# Patient Record
Sex: FEMALE | Race: BLACK OR AFRICAN AMERICAN | NOT HISPANIC OR LATINO | Employment: STUDENT | URBAN - METROPOLITAN AREA
[De-identification: names, ages, dates, MRNs, and addresses within clinical notes are randomized per-mention and may not be internally consistent; named-entity substitution may affect disease eponyms.]

---

## 2017-01-12 ENCOUNTER — OFFICE VISIT (OUTPATIENT)
Dept: OBSTETRICS AND GYNECOLOGY | Facility: CLINIC | Age: 27
End: 2017-01-12
Payer: MEDICAID

## 2017-01-12 VITALS
SYSTOLIC BLOOD PRESSURE: 126 MMHG | BODY MASS INDEX: 51.12 KG/M2 | DIASTOLIC BLOOD PRESSURE: 72 MMHG | WEIGHT: 260.38 LBS | HEIGHT: 60 IN

## 2017-01-12 DIAGNOSIS — Z01.419 WELL WOMAN EXAM WITH ROUTINE GYNECOLOGICAL EXAM: ICD-10-CM

## 2017-01-12 DIAGNOSIS — Z12.4 ROUTINE PAPANICOLAOU SMEAR: Primary | ICD-10-CM

## 2017-01-12 PROCEDURE — 99212 OFFICE O/P EST SF 10 MIN: CPT | Mod: PBBFAC,PO | Performed by: OBSTETRICS & GYNECOLOGY

## 2017-01-12 PROCEDURE — 99999 PR PBB SHADOW E&M-EST. PATIENT-LVL II: CPT | Mod: PBBFAC,,, | Performed by: OBSTETRICS & GYNECOLOGY

## 2017-01-12 PROCEDURE — 99385 PREV VISIT NEW AGE 18-39: CPT | Mod: S$PBB,,, | Performed by: OBSTETRICS & GYNECOLOGY

## 2017-01-12 PROCEDURE — 88175 CYTOPATH C/V AUTO FLUID REDO: CPT

## 2017-01-12 RX ORDER — NORELGESTROMIN AND ETHINYL ESTRADIOL 35; 150 UG/MG; UG/MG
1 PATCH TRANSDERMAL
Qty: 4 PATCH | Refills: 11 | Status: SHIPPED | OUTPATIENT
Start: 2017-01-12 | End: 2018-01-29 | Stop reason: SDUPTHER

## 2017-01-12 NOTE — PROGRESS NOTES
HPI:   26 y.o.   OB History      Para Term  AB TAB SAB Ectopic Multiple Living    2 2 2              Patient's last menstrual period was 2017 (within days).    Patient is  here for her annual gynecologic exam.  She has no complaints.     ROS:  GENERAL: No fever, chills, fatigability or weight loss.  SKIN: No rashes, itching or changes in color or texture of skin.  HEAD: No headaches or recent head trauma.  EYES: Visual acuity fine. No photophobia, ocular pain or diplopia.  EARS: Denies ear pain, discharge or vertigo.  NOSE: No loss of smell, no epistaxis or postnasal drip.  MOUTH & THROAT: No hoarseness or change in voice. No excessive gum bleeding.  NODES: Denies swollen glands.  CHEST: Denies TORRES, cyanosis, wheezing, cough and sputum production.  CARDIOVASCULAR: Denies chest pain, PND, orthopnea or reduced exercise tolerance.  ABDOMEN: Appetite fine. No weight loss. Denies diarrhea, abdominal pain, hematemesis or blood in stool.  URINARY: No flank pain, dysuria or hematuria.  PERIPHERAL VASCULAR: No claudication or cyanosis.  MUSCULOSKELETAL: No joint stiffness or swelling. Denies back pain.  NEUROLOGIC: No history of seizures, paralysis, alteration of gait or coordination.    PE:   Visit Vitals    /72    Ht 5' (1.524 m)    Wt 118.1 kg (260 lb 5.8 oz)    LMP 2017 (Within Days)    BMI 50.85 kg/m2     APPEARANCE: Well nourished, well developed, in no acute distress.  NECK: Neck symmetric without masses or thyromegaly.  BREASTS: Symmetrical, no skin changes or visible lesions. No palpable masses, nipple discharge or adenopathy bilaterally.  ABDOMEN: Flat. Soft. No tenderness or masses. No hepatosplenomegaly. No hernias. No CVA tenderness.  VULVA: No lesions. Normal female genitalia.  URETHRAL MEATUS: Normal size and location, no lesions, no prolapse.  URETHRA: No masses, tenderness, prolapse or scarring.  VAGINA: Moist and well rugated, no discharge, no significant cystocele or  rectocele.  CERVIX: No lesions and discharge. PAP done.  UTERUS: Normal size, regular shape, mobile, non-tender, bladder base nontender.  ADNEXA: No masses, tenderness or CDS nodularity.  ANUS PERINEUM: Normal.    PROCEDURES:  Pap smear    Assessment:  Normal Gynecologic Exam    Plan:  Mammogram and Colonoscopy if indicated by current recommendations.  Return to clinic in one year or for any problems or complaints.  Wasn't patches, was on depo   Hx reg cycles in past

## 2017-08-27 ENCOUNTER — HOSPITAL ENCOUNTER (EMERGENCY)
Facility: HOSPITAL | Age: 27
Discharge: HOME | End: 2017-08-27
Attending: EMERGENCY MEDICINE
Payer: MEDICAID

## 2017-08-27 VITALS
OXYGEN SATURATION: 99 % | DIASTOLIC BLOOD PRESSURE: 82 MMHG | BODY MASS INDEX: 52.25 KG/M2 | RESPIRATION RATE: 20 BRPM | HEIGHT: 60 IN | HEART RATE: 72 BPM | TEMPERATURE: 99 F | SYSTOLIC BLOOD PRESSURE: 128 MMHG | WEIGHT: 266.13 LBS

## 2017-08-27 DIAGNOSIS — M79.642 LEFT HAND PAIN: ICD-10-CM

## 2017-08-27 DIAGNOSIS — M25.532 LEFT WRIST PAIN: Primary | ICD-10-CM

## 2017-08-27 DIAGNOSIS — M25.539 WRIST PAIN: ICD-10-CM

## 2017-08-27 LAB
B-HCG UR QL: NEGATIVE
CTP QC/QA: YES

## 2017-08-27 PROCEDURE — 99284 EMERGENCY DEPT VISIT MOD MDM: CPT | Mod: ,,, | Performed by: PHYSICIAN ASSISTANT

## 2017-08-27 PROCEDURE — 99284 EMERGENCY DEPT VISIT MOD MDM: CPT | Mod: 25

## 2017-08-27 PROCEDURE — 29125 APPL SHORT ARM SPLINT STATIC: CPT | Mod: LT

## 2017-08-27 PROCEDURE — 25000003 PHARM REV CODE 250: Performed by: PHYSICIAN ASSISTANT

## 2017-08-27 PROCEDURE — 81025 URINE PREGNANCY TEST: CPT | Performed by: PHYSICIAN ASSISTANT

## 2017-08-27 RX ORDER — NAPROXEN 500 MG/1
500 TABLET ORAL 2 TIMES DAILY WITH MEALS
Qty: 20 TABLET | Refills: 0 | Status: SHIPPED | OUTPATIENT
Start: 2017-08-27 | End: 2018-04-03

## 2017-08-27 RX ORDER — NAPROXEN 500 MG/1
500 TABLET ORAL
Status: COMPLETED | OUTPATIENT
Start: 2017-08-27 | End: 2017-08-27

## 2017-08-27 RX ADMIN — NAPROXEN 500 MG: 500 TABLET ORAL at 02:08

## 2017-08-27 NOTE — ED TRIAGE NOTES
Pain to left hand and wrist since Wednesday.  Denies injury.    GENERAL: The patient is well-developed and well-nourished in no apparent distress. Alert and oriented x4.                                                HEENT: Head is normocephalic and atraumatic. Extraocular muscles are intact. Pupils are equal, round, and reactive to light and accommodation. Nares appeared normal. Mouth is well hydrated and without lesions. Mucous membranes are moist. Posterior pharynx clear of any exudate or lesions.    NECK: Supple. No carotid bruits. No lymphadenopathy or thyromegaly.    LUNGS: Clear to auscultation.    HEART: Regular rate and rhythm without murmur.     ABDOMEN: Soft, nontender, and nondistended. Positive bowel sounds. No hepatosplenomegaly was noted.     EXTREMITIES: Without any cyanosis, clubbing, rash, lesions or edema.     NEUROLOGIC: Cranial nerves II through XII are grossly intact.     PSYCHIATRIC: Flat affect, but denies suicidal or homicidal ideations.    SKIN: No ulceration or induration present.

## 2017-08-27 NOTE — ED PROVIDER NOTES
"Encounter Date: 8/27/2017    SCRIBE #1 NOTE: I, Pratik Barba, am scribing for, and in the presence of,  Dr. Adhikari . I have scribed the following portions of the note - the APC attestation.       History     Chief Complaint   Patient presents with    Hand Pain     pt states left wrist pain x 4 days - denies injury or trauma - no swelling noted      26 y/o AAF with PMHx of asthma presents to the ED c/o L wrist and hand pain x 6 days. She describes the pain as constant progressively worsening 10/10 "sharp" pain that is worse with movement. She is R hand dominant. She has taken ibuprofen, aleve, tylenol, baclofen with no relief. She denies any trauma. She does report minimal swelling. She denies any PSHx of the L hand or wrist. She reports some swelling but denies any erythema, warmth, rashes. She denies fever, chills, chest pain, SOB, abdominal pain, n/v. She socially drinks alcohol and denies tobacco or drug use.      The history is provided by the patient.     Review of patient's allergies indicates:  No Known Allergies  Past Medical History:   Diagnosis Date    Asthma      History reviewed. No pertinent surgical history.  Family History   Problem Relation Age of Onset    Hypertension Mother      Social History   Substance Use Topics    Smoking status: Never Smoker    Smokeless tobacco: Never Used    Alcohol use Yes      Comment: socially     Review of Systems   Constitutional: Negative for chills and fever.   HENT: Negative for congestion, rhinorrhea and sore throat.    Eyes: Negative for photophobia and visual disturbance.   Respiratory: Negative for shortness of breath.    Cardiovascular: Negative for chest pain.   Gastrointestinal: Negative for abdominal pain, constipation, diarrhea, nausea and vomiting.   Genitourinary: Negative for dysuria and hematuria.   Musculoskeletal: Positive for joint swelling and myalgias. Negative for neck pain and neck stiffness.   Skin: Negative for rash and wound. "   Neurological: Negative for dizziness, syncope, weakness, light-headedness, numbness and headaches.   Psychiatric/Behavioral: Negative for confusion.       Physical Exam     Initial Vitals [08/27/17 1300]   BP Pulse Resp Temp SpO2   139/69 75 16 98.8 °F (37.1 °C) 98 %      MAP       92.33         Physical Exam    Nursing note and vitals reviewed.  Constitutional: She appears well-developed and well-nourished. She is not diaphoretic. No distress.   HENT:   Head: Normocephalic and atraumatic.   Neck: Normal range of motion. Neck supple.   Cardiovascular: Normal rate, regular rhythm and normal heart sounds. Exam reveals no gallop and no friction rub.    No murmur heard.  Pulmonary/Chest: Breath sounds normal. She has no wheezes. She has no rhonchi. She has no rales.   Abdominal: Soft. Bowel sounds are normal. There is no tenderness. There is no rebound and no guarding.   Musculoskeletal: She exhibits tenderness.        Left wrist: She exhibits decreased range of motion, tenderness and bony tenderness. She exhibits no swelling.        Left hand: She exhibits decreased range of motion and tenderness. She exhibits no laceration. Normal sensation noted. Normal strength noted.   L radial pulse 2+   Neurological: She is alert and oriented to person, place, and time.   Skin: Skin is warm and dry. No rash noted. No erythema.   Psychiatric: She has a normal mood and affect.         ED Course   Procedures  Labs Reviewed   POCT URINE PREGNANCY        Imaging Results          X-Ray Wrist Complete Left (Final result)  Result time 08/27/17 15:01:32    Final result by Rhys Chappell MD (08/27/17 15:01:32)                 Impression:      1.  No acute displaced fracture or dislocation of the hand or wrist noted mild edema about the distal aspect of the arm, partially imaged, correlation recommended, could reflect contusion.        Electronically signed by: RHYS CHAPPELL MD  Date:     08/27/17  Time:    15:01               Narrative:    Wrist complete 3 views left, hand complete 3 views left    Clinical history: Left wrist and hand pain    Comparison: None    Findings:  3 views hand, 3 views wrist.    There is mild edema about the distal dorsal aspect of the arm.  No acute displaced fracture or dislocation of the wrist or hand.  There is minimal edema of the dorsal aspect of the hand.                             X-Ray Hand 3 view Left (Final result)  Result time 08/27/17 15:01:32    Final result by Rhys Chappell MD (08/27/17 15:01:32)                 Impression:      1.  No acute displaced fracture or dislocation of the hand or wrist noted mild edema about the distal aspect of the arm, partially imaged, correlation recommended, could reflect contusion.        Electronically signed by: RHYS CHAPPELL MD  Date:     08/27/17  Time:    15:01              Narrative:    Wrist complete 3 views left, hand complete 3 views left    Clinical history: Left wrist and hand pain    Comparison: None    Findings:  3 views hand, 3 views wrist.    There is mild edema about the distal dorsal aspect of the arm.  No acute displaced fracture or dislocation of the wrist or hand.  There is minimal edema of the dorsal aspect of the hand.                                 Medical Decision Making:   History:   Old Medical Records: I decided to obtain old medical records.  Clinical Tests:   Lab Tests: Ordered and Reviewed  Radiological Study: Ordered and Reviewed       APC / Resident Notes:   26 y/o AAF with PMHx of asthma presents to the ED c/o L wrist and hand pain x 6 days.  VSS. RRR. Lungs CTA. Abdomen soft and nontender. Tenderness noted to the L wrist and hand. No significant swelling. No erythema, warmth, rashes. L radial pulse 2+. Normal sensation. I do not suspect ischemic limb. DDx includes but is not limited to fracture, dislocation, sprain. Will get xrays.     UPT negative.    Xray L wrist and L hand with no fracture or dislocation.  I do not feel  that she needs any further labs or imaging at this time. Stable for discharge.    L wrist placed in wrist brace. She was discharged with a prescription for naproxen.  She will follow up with her PCP and orthopedics.  All of the patient's questions were answered.  I reviewed the patient's chart, labs, and imaging and discussed the case with my supervising physician.          Scribe Attestation:   Scribe #1: I performed the above scribed service and the documentation accurately describes the services I performed. I attest to the accuracy of the note.    Attending Attestation:     Physician Attestation Statement for NP/PA:   I discussed this assessment and plan of this patient with the NP/PA, but I did not personally examine the patient. The face to face encounter was performed by the NP/PA.    Other NP/PA Attestation Additions:    History of Present Illness: 28 yo female presents for evaluation of left wrist pain without hx of trauma.          Physician Attestation for Scribe:  Physician Attestation Statement for Scribe #1: I, Dr. Adhikari , reviewed documentation, as scribed by Pratik Barba in my presence, and it is both accurate and complete.                 ED Course     Clinical Impression:   The primary encounter diagnosis was Left wrist pain. Diagnoses of Wrist pain and Left hand pain were also pertinent to this visit.    Disposition:   Disposition: Discharged  Condition: Stable                        Brissa Cornelius PA-C  08/27/17 5384

## 2018-01-29 RX ORDER — NORELGESTROMIN AND ETHINYL ESTRADIOL 35; 150 UG/MG; UG/MG
1 PATCH TRANSDERMAL
Qty: 4 PATCH | Refills: 0 | Status: SHIPPED | OUTPATIENT
Start: 2018-01-29 | End: 2019-01-29

## 2018-01-29 NOTE — TELEPHONE ENCOUNTER
Returned patients call.   Patient was notified she is due for an annual and only one month can be sent in until she schedules. Patient verbalized understanding. Patient will schedule as soon as possible.

## 2018-01-29 NOTE — TELEPHONE ENCOUNTER
----- Message from Lazaro Acosta sent at 1/29/2018 11:15 AM CST -----  Contact: [phone]/self  Pt requesting to speak with you concerning her prescriptions.  Please call and advise

## 2018-03-11 ENCOUNTER — HOSPITAL ENCOUNTER (EMERGENCY)
Facility: HOSPITAL | Age: 28
Discharge: HOME | End: 2018-03-11
Attending: EMERGENCY MEDICINE
Payer: MEDICAID

## 2018-03-11 VITALS
RESPIRATION RATE: 18 BRPM | TEMPERATURE: 100 F | HEART RATE: 91 BPM | DIASTOLIC BLOOD PRESSURE: 77 MMHG | OXYGEN SATURATION: 99 % | SYSTOLIC BLOOD PRESSURE: 132 MMHG | HEIGHT: 60 IN

## 2018-03-11 DIAGNOSIS — H10.9 CONJUNCTIVITIS, UNSPECIFIED CONJUNCTIVITIS TYPE, UNSPECIFIED LATERALITY: ICD-10-CM

## 2018-03-11 DIAGNOSIS — J02.0 STREP PHARYNGITIS: Primary | ICD-10-CM

## 2018-03-11 PROCEDURE — 99283 EMERGENCY DEPT VISIT LOW MDM: CPT

## 2018-03-11 PROCEDURE — 25000003 PHARM REV CODE 250: Performed by: EMERGENCY MEDICINE

## 2018-03-11 PROCEDURE — 99283 EMERGENCY DEPT VISIT LOW MDM: CPT | Mod: ,,, | Performed by: EMERGENCY MEDICINE

## 2018-03-11 RX ORDER — ACETAMINOPHEN 650 MG/20.3ML
650 LIQUID ORAL
Status: COMPLETED | OUTPATIENT
Start: 2018-03-11 | End: 2018-03-11

## 2018-03-11 RX ORDER — PENICILLIN V POTASSIUM 250 MG/5ML
500 POWDER, FOR SOLUTION ORAL 2 TIMES DAILY
Qty: 200 ML | Refills: 0 | Status: SHIPPED | OUTPATIENT
Start: 2018-03-11 | End: 2018-03-21

## 2018-03-11 RX ADMIN — ACETAMINOPHEN 650 MG: 650 SOLUTION ORAL at 06:03

## 2018-03-11 NOTE — ED TRIAGE NOTES
Presents to ER with complaint of sore throat for 3 days.  Also complaining of burning and itching to both eyes.  States that she saw her eye doctor and was told she has viral conjunctivitis.    Pt identifiers checked and correct  LOC: The patient is awake, alert, aware of environment with an appropriate affect. Oriented x3, speaking appropriately  APPEARANCE: Pt resting comfortably, in no acute distress, pt is clean and well groomed, clothing properly fastened  SKIN: Skin warm, dry and intact, normal skin turgor, moist mucus membranes  RESPIRATORY: Airway is open and patent, respirations are spontaneous, even and unlabored, normal effort and rate  MUSCULOSKELETAL: No obvious deformities.

## 2018-03-11 NOTE — ED PROVIDER NOTES
"Encounter Date: 3/11/2018    SCRIBE #1 NOTE: I, Renetta Darnell, am scribing for, and in the presence of, Dr. Coy Bhat.       History     Chief Complaint   Patient presents with    Sore Throat     sore throat, "can't swallow", both eyes redness    eye redness     Time seen by provider: 5:57 PM    This is a 28 y.o. female with medical conditions including asthma, who presents with complaint of sore throat, bilateral ear pain, and sinus congestion for 2-3 days. The patient reports she has had associated fever, chills and eye discharge. Her children were evaluated for a similar illness about 2 weeks ago and reports they were determined to have strep and a viral illness.       The history is provided by the patient.     Review of patient's allergies indicates:  No Known Allergies  Past Medical History:   Diagnosis Date    Asthma      History reviewed. No pertinent surgical history.  Family History   Problem Relation Age of Onset    Hypertension Mother      Social History   Substance Use Topics    Smoking status: Never Smoker    Smokeless tobacco: Never Used    Alcohol use Yes      Comment: socially     Review of Systems   Constitutional: Positive for chills and fever.   HENT: Positive for congestion, ear pain, postnasal drip, sinus pressure and sore throat.    Eyes: Positive for pain (Bilateral, associated with sinus congestion) and discharge. Negative for visual disturbance.   Respiratory: Positive for cough. Negative for shortness of breath.    Cardiovascular: Negative for chest pain.   Gastrointestinal: Negative for abdominal pain.   Genitourinary: Negative for difficulty urinating.   Musculoskeletal: Negative for gait problem.   Skin: Negative for rash.   Neurological: Negative for speech difficulty.       Physical Exam     Initial Vitals [03/11/18 1652]   BP Pulse Resp Temp SpO2   132/77 91 18 100 °F (37.8 °C) 99 %      MAP       95.33         Physical Exam    Nursing note and vitals " reviewed.  Constitutional: She appears well-developed and well-nourished. She is not diaphoretic. No distress.   HENT:   Head: Normocephalic and atraumatic.   Right Ear: Tympanic membrane normal. Tympanic membrane is not erythematous and not bulging.   Left Ear: Tympanic membrane normal. Tympanic membrane is not erythematous and not bulging.   Erythema to tonsils bilaterally. They are enlarged with multiple pustules.    Eyes: EOM are normal. Pupils are equal, round, and reactive to light. Right conjunctiva is injected. Left conjunctiva is injected.   Neck: Normal range of motion. Neck supple.   Cardiovascular: Normal rate, regular rhythm and normal heart sounds.   Pulmonary/Chest: Breath sounds normal. No respiratory distress.   Abdominal: Soft. She exhibits no distension.   Musculoskeletal: Normal range of motion. She exhibits no edema.   Lymphadenopathy:     She has cervical adenopathy (tender, anterior ).   Neurological: She is alert and oriented to person, place, and time. She has normal strength.   Skin: Skin is warm and dry.   Psychiatric: She has a normal mood and affect.         ED Course   Procedures  Labs Reviewed - No data to display          Medical Decision Making:   History:   Old Medical Records: I decided to obtain old medical records.  Initial Assessment:   Patient here with symptoms consistent with strep pharengitis and possible viral conjunctivitis. She is hemodynamically stable. Temperature slightly elevated on check in, patient given liquid tylenol. I have given a prescription for oral PCN for 10 day. No overly concerning findings. Patient stable for discharge. Advised pt to follow up with PCP or return if concerning symptoms arise. Pt understands and agrees with plan. Will d/c home.                Scribe Attestation:   Scribe #1: I performed the above scribed service and the documentation accurately describes the services I performed. I attest to the accuracy of the note.               Clinical  Impression:   The primary encounter diagnosis was Strep pharyngitis. A diagnosis of Conjunctivitis, unspecified conjunctivitis type, unspecified laterality was also pertinent to this visit.    Disposition:   Disposition: Discharged  Condition: Stable                        Coy Bhat MD  03/11/18 2120

## 2018-04-03 ENCOUNTER — OFFICE VISIT (OUTPATIENT)
Dept: OBSTETRICS AND GYNECOLOGY | Facility: CLINIC | Age: 28
End: 2018-04-03
Payer: MEDICAID

## 2018-04-03 VITALS
BODY MASS INDEX: 52.15 KG/M2 | HEIGHT: 60 IN | SYSTOLIC BLOOD PRESSURE: 126 MMHG | WEIGHT: 265.63 LBS | DIASTOLIC BLOOD PRESSURE: 78 MMHG

## 2018-04-03 DIAGNOSIS — Z01.419 WELL WOMAN EXAM WITH ROUTINE GYNECOLOGICAL EXAM: ICD-10-CM

## 2018-04-03 DIAGNOSIS — Z12.4 ROUTINE PAPANICOLAOU SMEAR: Primary | ICD-10-CM

## 2018-04-03 PROCEDURE — 99395 PREV VISIT EST AGE 18-39: CPT | Mod: S$PBB,,, | Performed by: OBSTETRICS & GYNECOLOGY

## 2018-04-03 PROCEDURE — 88175 CYTOPATH C/V AUTO FLUID REDO: CPT

## 2018-04-03 PROCEDURE — 99212 OFFICE O/P EST SF 10 MIN: CPT | Mod: PBBFAC,PO | Performed by: OBSTETRICS & GYNECOLOGY

## 2018-04-03 PROCEDURE — 99999 PR PBB SHADOW E&M-EST. PATIENT-LVL II: CPT | Mod: PBBFAC,,, | Performed by: OBSTETRICS & GYNECOLOGY

## 2018-04-03 RX ORDER — NORELGESTROMIN AND ETHINYL ESTRADIOL 35; 150 UG/MG; UG/MG
1 PATCH TRANSDERMAL
Qty: 4 PATCH | Refills: 11 | Status: SHIPPED | OUTPATIENT
Start: 2018-04-03 | End: 2019-05-23 | Stop reason: SDUPTHER

## 2018-04-03 NOTE — PROGRESS NOTES
HPI:   28 y.o.   OB History      Para Term  AB Living    2 2 2          SAB TAB Ectopic Multiple Live Births                    No LMP recorded (lmp unknown).    Patient is  here for her annual gynecologic exam.  She has no complaints.     ROS:  GENERAL: No fever, chills, fatigability or weight loss.  SKIN: No rashes, itching or changes in color or texture of skin.  HEAD: No headaches or recent head trauma.  EYES: Visual acuity fine. No photophobia, ocular pain or diplopia.  EARS: Denies ear pain, discharge or vertigo.  NOSE: No loss of smell, no epistaxis or postnasal drip.  MOUTH & THROAT: No hoarseness or change in voice. No excessive gum bleeding.  NODES: Denies swollen glands.  CHEST: Denies TORRES, cyanosis, wheezing, cough and sputum production.  CARDIOVASCULAR: Denies chest pain, PND, orthopnea or reduced exercise tolerance.  ABDOMEN: Appetite fine. No weight loss. Denies diarrhea, abdominal pain, hematemesis or blood in stool.  URINARY: No flank pain, dysuria or hematuria.  PERIPHERAL VASCULAR: No claudication or cyanosis.  MUSCULOSKELETAL: No joint stiffness or swelling. Denies back pain.  NEUROLOGIC: No history of seizures, paralysis, alteration of gait or coordination.    PE:   /78   Ht 5' (1.524 m)   Wt 120.5 kg (265 lb 10.5 oz)   LMP  (LMP Unknown)   BMI 51.88 kg/m²   APPEARANCE: Well nourished, well developed, in no acute distress.  NECK: Neck symmetric without masses or thyromegaly.  BREASTS: Symmetrical, no skin changes or visible lesions. No palpable masses, nipple discharge or adenopathy bilaterally.  ABDOMEN: Flat. Soft. No tenderness or masses. No hepatosplenomegaly. No hernias. No CVA tenderness.  VULVA: No lesions. Normal female genitalia.  URETHRAL MEATUS: Normal size and location, no lesions, no prolapse.  URETHRA: No masses, tenderness, prolapse or scarring.  VAGINA: Moist and well rugated, no discharge, no significant cystocele or rectocele.  CERVIX: No lesions and  discharge. PAP done.  UTERUS: Normal size, regular shape, mobile, non-tender, bladder base nontender.  ADNEXA: No masses, tenderness or CDS nodularity.  ANUS PERINEUM: Normal.    PROCEDURES:  Pap smear    Assessment:  Normal Gynecologic Exam    Plan:  Mammogram and Colonoscopy if indicated by current recommendations.  Return to clinic in one year or for any problems or complaints.  Using patch  Off now /restart

## 2018-04-04 ENCOUNTER — PATIENT MESSAGE (OUTPATIENT)
Dept: OBSTETRICS AND GYNECOLOGY | Facility: CLINIC | Age: 28
End: 2018-04-04

## 2018-04-04 NOTE — TELEPHONE ENCOUNTER
Pt would like to know if you can prescribe xanax because she is flying to [place] this weekend and is afraid

## 2018-04-05 RX ORDER — ALPRAZOLAM 0.5 MG/1
0.5 TABLET ORAL DAILY
Qty: 10 TABLET | Refills: 0 | Status: SHIPPED | OUTPATIENT
Start: 2018-04-05 | End: 2019-04-05

## 2018-10-23 ENCOUNTER — HOSPITAL ENCOUNTER (EMERGENCY)
Facility: HOSPITAL | Age: 28
Discharge: HOME | End: 2018-10-23
Attending: EMERGENCY MEDICINE
Payer: MEDICAID

## 2018-10-23 VITALS
SYSTOLIC BLOOD PRESSURE: 128 MMHG | BODY MASS INDEX: 51.65 KG/M2 | HEIGHT: 61 IN | RESPIRATION RATE: 16 BRPM | HEART RATE: 79 BPM | WEIGHT: 273.56 LBS | TEMPERATURE: 100 F | DIASTOLIC BLOOD PRESSURE: 71 MMHG | OXYGEN SATURATION: 100 %

## 2018-10-23 DIAGNOSIS — S99.912A LEFT ANKLE INJURY: ICD-10-CM

## 2018-10-23 LAB
B-HCG UR QL: NEGATIVE
CTP QC/QA: YES

## 2018-10-23 PROCEDURE — 99283 EMERGENCY DEPT VISIT LOW MDM: CPT | Mod: 25

## 2018-10-23 PROCEDURE — 81025 URINE PREGNANCY TEST: CPT | Performed by: PHYSICIAN ASSISTANT

## 2018-10-23 PROCEDURE — 25000003 PHARM REV CODE 250: Performed by: PHYSICIAN ASSISTANT

## 2018-10-23 PROCEDURE — 99284 EMERGENCY DEPT VISIT MOD MDM: CPT | Mod: ,,, | Performed by: PHYSICIAN ASSISTANT

## 2018-10-23 RX ORDER — METHOCARBAMOL 500 MG/1
1000 TABLET, FILM COATED ORAL 3 TIMES DAILY
Qty: 15 TABLET | Refills: 0 | Status: SHIPPED | OUTPATIENT
Start: 2018-10-23 | End: 2018-10-28

## 2018-10-23 RX ORDER — ACETAMINOPHEN 500 MG
1000 TABLET ORAL
Status: COMPLETED | OUTPATIENT
Start: 2018-10-23 | End: 2018-10-23

## 2018-10-23 RX ORDER — NAPROXEN 500 MG/1
500 TABLET ORAL 2 TIMES DAILY WITH MEALS
Qty: 25 TABLET | Refills: 0 | Status: SHIPPED | OUTPATIENT
Start: 2018-10-23 | End: 2020-11-17

## 2018-10-23 RX ADMIN — ACETAMINOPHEN 1000 MG: 500 TABLET, FILM COATED ORAL at 08:10

## 2018-10-24 NOTE — ED PROVIDER NOTES
Encounter Date: 10/23/2018       History     Chief Complaint   Patient presents with    Ankle Pain     L ankle for past 2-3 weeks, and it feels warm     28 year old female with medical history of asthma, obesity presenting to the ED with the chief complaint of left ankle pain. Patient reports the pain started 3 weeks ago. She denies any inciting trauma or falls. She locates the pain to her lateral malleolus. She describes it as a localized, intermittent, throbbing sensation that increases with walking. She has been taking IBU 800mg with moderate relief. She denies other arthralgias, myalgias, calf pain, calf tenderness. She denies fever, chest pain, SOB. She denies history of blood clots, sedentary lifestyle, or recent surgeries. She is currently on OCP.           Review of patient's allergies indicates:  No Known Allergies  Past Medical History:   Diagnosis Date    Asthma      History reviewed. No pertinent surgical history.  Family History   Problem Relation Age of Onset    Hypertension Mother      Social History     Tobacco Use    Smoking status: Never Smoker    Smokeless tobacco: Never Used   Substance Use Topics    Alcohol use: No     Frequency: Never     Comment: socially    Drug use: No     Review of Systems   Constitutional: Negative for chills, diaphoresis and fever.   HENT: Negative for congestion and sore throat.    Respiratory: Negative for cough and shortness of breath.    Cardiovascular: Negative for chest pain.   Gastrointestinal: Negative for nausea.   Genitourinary: Negative for dysuria.   Musculoskeletal: Positive for arthralgias (L ankle). Negative for back pain, neck pain and neck stiffness.   Skin: Negative for rash.   Neurological: Negative for weakness.   Hematological: Does not bruise/bleed easily.       Physical Exam     Initial Vitals [10/23/18 1853]   BP Pulse Resp Temp SpO2   128/71 79 16 99.6 °F (37.6 °C) 100 %      MAP       --         Physical Exam    Constitutional: She appears  well-developed and well-nourished. She is not diaphoretic. No distress.   Obese female   HENT:   Head: Normocephalic and atraumatic.   Mouth/Throat: Oropharynx is clear and moist. No oropharyngeal exudate.   Eyes: EOM are normal. Pupils are equal, round, and reactive to light.   Neck: Normal range of motion. Neck supple.   Cardiovascular: Normal rate, regular rhythm and intact distal pulses.   Pulmonary/Chest: Breath sounds normal. No respiratory distress. She has no wheezes.   Speaking in full sentences without difficulty   Abdominal: Soft. She exhibits no distension. There is no tenderness.   Musculoskeletal:   Mild edema to TTP to left lateral malleolus. Intact ROM. No erythema or induration. Warmth to feet symmetric.   No calf tenderness or asymmetry. Negative Denzel's sign.    Neurological: She is alert and oriented to person, place, and time. She has normal strength. No cranial nerve deficit or sensory deficit.   Skin: Skin is warm and dry. No erythema.       ED Course   Procedures  Labs Reviewed   POCT URINE PREGNANCY          Imaging Results          X-Ray Ankle Complete Left (Final result)  Result time 10/23/18 20:29:05    Final result by Amilcar Castillo MD (10/23/18 20:29:05)                 Impression:      Left ankle nonspecific soft tissue swelling without acute displaced fracture-dislocation identified, which may represent sprain.      Electronically signed by: Amilcar Castillo MD  Date:    10/23/2018  Time:    20:29             Narrative:    EXAMINATION:  XR ANKLE COMPLETE 3 VIEW LEFT    CLINICAL HISTORY:  Unspecified injury of left ankle, initial encounter    TECHNIQUE:  AP, lateral and oblique views of the left ankle were performed.    COMPARISON:  None    FINDINGS:  Bones are well mineralized. Alignment is within normal limits.  Nonspecific soft tissue swelling noted about the ankle.  Ankle mortise otherwise appears intact.  No displaced fracture, dislocation or destructive osseous process.  No large  suprapatellar joint effusion. Joint spaces appear maintained.  No subcutaneous emphysema or radiodense retained foreign body.                                       APC / Resident Notes:   28 year old female with medical history of asthma, obesity presenting to the ED c/o left ankle pain for 3 weeks. DDx includes but not limited to contusion, muscular strain, ligament injury, fracture, dislocation, gout, osteoarthritis. I have considered but do not suspect DVT, arterial occlusion, septic joint. Will give Tylenol and obtain x-ray of ankle.     X-ray shows non-specific soft tissue swelling of the left ankle without fracture or dislocation. Etiology most consistent with ankle sprain. Patient able to ambulate without assistance. Will place in air splint and have patient follow-up with PCP. RX for Naprosyn and Robaxin given. Return to ED precautions given for new, worsening, or concerning symptoms. I have discussed the care of this patient with my supervising physician.          Attending Attestation:     Physician Attestation Statement for NP/PA:   I discussed this assessment and plan of this patient with the NP/PA, but I did not personally examine the patient. The face to face encounter was performed by the NP/PA.                     Clinical Impression:   The encounter diagnosis was Left ankle injury.      Disposition:   Disposition: Discharged  Condition: Stable                        Amilcar Dickinson PA-C  10/24/18 0106       Javier Nash MD  10/27/18 2010

## 2018-10-24 NOTE — ED NOTES
Patient identifiers verified and correct for MS Leung  C/C: Pain to left ankle  APPEARANCE: awake and alert in NAD.  SKIN: warm, dry and intact. No breakdown or bruising.  MUSCULOSKELETAL: Patient moving all extremities spontaneously, no obvious swelling or deformities noted. Ambulates independently.  RESPIRATORY: Denies shortness of breath.Respirations unlabored.   CARDIAC: Denies CP, 2+ distal pulses; no peripheral edema  ABDOMEN: S/ND/NT, Denies nausea  : voids spontaneously, denies difficulty  Neurologic: AAO x 4; follows commands equal strength in all extremities; denies numbness/tingling. Denies dizziness Denies weakness, positive sensation, mvmt and pedal pulses

## 2018-10-24 NOTE — ED NOTES
Pt does not want the air splint applied yet. She wishes to take it home with her. Pt provided with splint to take home.

## 2018-10-24 NOTE — ED TRIAGE NOTES
"Patient states "shooting" pain to left ankle x 3 weeks, denies injury, ibuprofen 800 mg yesterday.   "

## 2018-10-24 NOTE — DISCHARGE INSTRUCTIONS
Please keep your leg elevated while at rest. You may use warm compresses for additional pain control

## 2018-12-28 ENCOUNTER — PATIENT MESSAGE (OUTPATIENT)
Dept: OBSTETRICS AND GYNECOLOGY | Facility: CLINIC | Age: 28
End: 2018-12-28

## 2018-12-28 RX ORDER — FLUCONAZOLE 100 MG/1
100 TABLET ORAL DAILY
Qty: 3 TABLET | Refills: 0 | Status: SHIPPED | OUTPATIENT
Start: 2018-12-28 | End: 2018-12-31

## 2019-05-14 ENCOUNTER — PATIENT MESSAGE (OUTPATIENT)
Dept: OBSTETRICS AND GYNECOLOGY | Facility: CLINIC | Age: 29
End: 2019-05-14

## 2019-05-23 ENCOUNTER — TELEPHONE (OUTPATIENT)
Dept: OBSTETRICS AND GYNECOLOGY | Facility: CLINIC | Age: 29
End: 2019-05-23

## 2019-05-23 ENCOUNTER — OFFICE VISIT (OUTPATIENT)
Dept: OBSTETRICS AND GYNECOLOGY | Facility: CLINIC | Age: 29
End: 2019-05-23
Payer: MEDICAID

## 2019-05-23 VITALS
DIASTOLIC BLOOD PRESSURE: 68 MMHG | BODY MASS INDEX: 44.35 KG/M2 | HEIGHT: 61 IN | SYSTOLIC BLOOD PRESSURE: 120 MMHG | WEIGHT: 234.88 LBS

## 2019-05-23 DIAGNOSIS — Z12.4 ROUTINE PAPANICOLAOU SMEAR: Primary | ICD-10-CM

## 2019-05-23 DIAGNOSIS — Z01.419 WELL WOMAN EXAM WITH ROUTINE GYNECOLOGICAL EXAM: ICD-10-CM

## 2019-05-23 PROCEDURE — 99395 PREV VISIT EST AGE 18-39: CPT | Mod: S$PBB,,, | Performed by: OBSTETRICS & GYNECOLOGY

## 2019-05-23 PROCEDURE — 99213 OFFICE O/P EST LOW 20 MIN: CPT | Mod: PBBFAC,PO | Performed by: OBSTETRICS & GYNECOLOGY

## 2019-05-23 PROCEDURE — 99999 PR PBB SHADOW E&M-EST. PATIENT-LVL III: ICD-10-PCS | Mod: PBBFAC,,, | Performed by: OBSTETRICS & GYNECOLOGY

## 2019-05-23 PROCEDURE — 99999 PR PBB SHADOW E&M-EST. PATIENT-LVL III: CPT | Mod: PBBFAC,,, | Performed by: OBSTETRICS & GYNECOLOGY

## 2019-05-23 PROCEDURE — 88175 CYTOPATH C/V AUTO FLUID REDO: CPT

## 2019-05-23 PROCEDURE — 99395 PR PREVENTIVE VISIT,EST,18-39: ICD-10-PCS | Mod: S$PBB,,, | Performed by: OBSTETRICS & GYNECOLOGY

## 2019-05-23 RX ORDER — NORELGESTROMIN AND ETHINYL ESTRADIOL 35; 150 UG/MG; UG/MG
1 PATCH TRANSDERMAL
Qty: 4 PATCH | Refills: 11 | Status: SHIPPED | OUTPATIENT
Start: 2019-05-23 | End: 2020-05-31

## 2019-05-23 NOTE — TELEPHONE ENCOUNTER
----- Message from Can Martin sent at 5/23/2019  8:15 AM CDT -----  Contact: [phone]  Patient is in route to the appt and advised she will be there in less than 10 minutes.

## 2019-05-23 NOTE — PROGRESS NOTES
"HPI:   29 y.o.   OB History        2    Para   2    Term   2            AB        Living           SAB        TAB        Ectopic        Multiple        Live Births                  Patient's last menstrual period was 2019 (exact date).    Patient is  here for her annual gynecologic exam.  She has no complaints.     ROS:  GENERAL: No fever, chills, fatigability or weight loss.  SKIN: No rashes, itching or changes in color or texture of skin.  HEAD: No headaches or recent head trauma.  EYES: Visual acuity fine. No photophobia, ocular pain or diplopia.  EARS: Denies ear pain, discharge or vertigo.  NOSE: No loss of smell, no epistaxis or postnasal drip.  MOUTH & THROAT: No hoarseness or change in voice. No excessive gum bleeding.  NODES: Denies swollen glands.  CHEST: Denies TORRES, cyanosis, wheezing, cough and sputum production.  CARDIOVASCULAR: Denies chest pain, PND, orthopnea or reduced exercise tolerance.  ABDOMEN: Appetite fine. No weight loss. Denies diarrhea, abdominal pain, hematemesis or blood in stool.  URINARY: No flank pain, dysuria or hematuria.  PERIPHERAL VASCULAR: No claudication or cyanosis.  MUSCULOSKELETAL: No joint stiffness or swelling. Denies back pain.  NEUROLOGIC: No history of seizures, paralysis, alteration of gait or coordination.    PE:   /68   Ht 5' 1" (1.549 m)   Wt 106.5 kg (234 lb 14.4 oz)   LMP 2019 (Exact Date)   BMI 44.38 kg/m²   APPEARANCE: Well nourished, well developed, in no acute distress.  NECK: Neck symmetric without masses or thyromegaly.  BREASTS: Symmetrical, no skin changes or visible lesions. No palpable masses, nipple discharge or adenopathy bilaterally.  ABDOMEN: Flat. Soft. No tenderness or masses. No hepatosplenomegaly. No hernias. No CVA tenderness.  VULVA: No lesions. Normal female genitalia.  URETHRAL MEATUS: Normal size and location, no lesions, no prolapse.  URETHRA: No masses, tenderness, prolapse or scarring.  VAGINA: Moist and " well rugated, no discharge, no significant cystocele or rectocele.  CERVIX: No lesions and discharge. PAP done.  UTERUS: Normal size, regular shape, mobile, non-tender, bladder base nontender.  ADNEXA: No masses, tenderness or CDS nodularity.  ANUS PERINEUM: Normal.    PROCEDURES:  Pap smear    Assessment:  Normal Gynecologic Exam    Plan:  Mammogram and Colonoscopy if indicated by current recommendations.  Return to clinic in one year or for any problems or complaints.  Doing well on patch  Lost 35 lbs, gastric in march

## 2020-04-02 ENCOUNTER — TELEPHONE (OUTPATIENT)
Dept: OBSTETRICS AND GYNECOLOGY | Facility: CLINIC | Age: 30
End: 2020-04-02

## 2020-04-02 ENCOUNTER — PATIENT MESSAGE (OUTPATIENT)
Dept: OBSTETRICS AND GYNECOLOGY | Facility: CLINIC | Age: 30
End: 2020-04-02

## 2020-04-02 ENCOUNTER — LAB VISIT (OUTPATIENT)
Dept: LAB | Facility: HOSPITAL | Age: 30
End: 2020-04-02
Attending: OBSTETRICS & GYNECOLOGY
Payer: MEDICAID

## 2020-04-02 DIAGNOSIS — R10.2 VAGINAL PAIN: ICD-10-CM

## 2020-04-02 DIAGNOSIS — R10.2 VAGINAL PAIN: Primary | ICD-10-CM

## 2020-04-02 PROCEDURE — 87491 CHLMYD TRACH DNA AMP PROBE: CPT

## 2020-04-02 PROCEDURE — 87086 URINE CULTURE/COLONY COUNT: CPT

## 2020-04-02 NOTE — TELEPHONE ENCOUNTER
Called patient, patient stated she was told to go turn a urine sample into the lab. Patient stated she did that and she was calling to get the results and see if provider sent a Rx in for her. Informed patient the results are still in process. Patient stated to please send message to provider on high priority to send in medication.

## 2020-04-02 NOTE — TELEPHONE ENCOUNTER
Have pt submit urine for culture AND chlamydia urine test  Can send macrobid after submitted  thanks

## 2020-04-02 NOTE — TELEPHONE ENCOUNTER
----- Message from Paco Webber sent at 4/2/2020  4:31 PM CDT -----  Contact: [phone] / self   Patient would like to speak with your office regarding results from urine specimen. Please Advise.

## 2020-04-03 LAB
BACTERIA UR CULT: NO GROWTH
C TRACH DNA SPEC QL NAA+PROBE: NOT DETECTED
N GONORRHOEA DNA SPEC QL NAA+PROBE: DETECTED

## 2020-04-03 RX ORDER — NITROFURANTOIN 25; 75 MG/1; MG/1
100 CAPSULE ORAL 2 TIMES DAILY
Qty: 10 CAPSULE | Refills: 1 | Status: SHIPPED | OUTPATIENT
Start: 2020-04-03 | End: 2021-03-03

## 2020-04-03 NOTE — TELEPHONE ENCOUNTER
Pend the macrobid, wanted her to start it after dropping off urine  I pended it, thanks  Tell her  See below   normal (ped)...

## 2020-04-04 ENCOUNTER — PATIENT MESSAGE (OUTPATIENT)
Dept: OBSTETRICS AND GYNECOLOGY | Facility: CLINIC | Age: 30
End: 2020-04-04

## 2020-04-06 ENCOUNTER — CLINICAL SUPPORT (OUTPATIENT)
Dept: OBSTETRICS AND GYNECOLOGY | Facility: CLINIC | Age: 30
End: 2020-04-06
Payer: MEDICAID

## 2020-04-06 DIAGNOSIS — A54.9 GONORRHEA: Primary | ICD-10-CM

## 2020-04-06 PROCEDURE — 96372 THER/PROPH/DIAG INJ SC/IM: CPT | Mod: PBBFAC,PO

## 2020-04-06 RX ORDER — CEFTRIAXONE 250 MG/1
250 INJECTION, POWDER, FOR SOLUTION INTRAMUSCULAR; INTRAVENOUS
Status: COMPLETED | OUTPATIENT
Start: 2020-04-06 | End: 2020-04-06

## 2020-04-06 RX ADMIN — CEFTRIAXONE SODIUM 250 MG: 250 INJECTION, POWDER, FOR SOLUTION INTRAMUSCULAR; INTRAVENOUS at 02:04

## 2020-04-06 NOTE — PROGRESS NOTES
Identified two patient identifiers. Reviewed allergies. Administered Rocephin in [facility]. Patient tolerated well.

## 2020-04-06 NOTE — TELEPHONE ENCOUNTER
I just saw the results,  She needs to come for  A shot, 250 rocephon im  Then see me 5-6 weeks for retesting in [place]one  thanks

## 2020-04-14 ENCOUNTER — PATIENT MESSAGE (OUTPATIENT)
Dept: OBSTETRICS AND GYNECOLOGY | Facility: CLINIC | Age: 30
End: 2020-04-14

## 2020-04-14 DIAGNOSIS — Z11.3 SCREENING FOR STD (SEXUALLY TRANSMITTED DISEASE): Primary | ICD-10-CM

## 2020-04-21 ENCOUNTER — PATIENT MESSAGE (OUTPATIENT)
Dept: OBSTETRICS AND GYNECOLOGY | Facility: CLINIC | Age: 30
End: 2020-04-21

## 2020-05-07 ENCOUNTER — OFFICE VISIT (OUTPATIENT)
Dept: OBSTETRICS AND GYNECOLOGY | Facility: CLINIC | Age: 30
End: 2020-05-07
Payer: MEDICAID

## 2020-05-07 VITALS
WEIGHT: 187.19 LBS | SYSTOLIC BLOOD PRESSURE: 112 MMHG | DIASTOLIC BLOOD PRESSURE: 67 MMHG | BODY MASS INDEX: 35.34 KG/M2 | HEIGHT: 61 IN

## 2020-05-07 DIAGNOSIS — Z11.3 SCREENING EXAMINATION FOR STD (SEXUALLY TRANSMITTED DISEASE): Primary | ICD-10-CM

## 2020-05-07 PROCEDURE — 99213 OFFICE O/P EST LOW 20 MIN: CPT | Mod: PBBFAC,PO | Performed by: OBSTETRICS & GYNECOLOGY

## 2020-05-07 PROCEDURE — 87491 CHLMYD TRACH DNA AMP PROBE: CPT

## 2020-05-07 PROCEDURE — 99213 PR OFFICE/OUTPT VISIT, EST, LEVL III, 20-29 MIN: ICD-10-PCS | Mod: S$PBB,,, | Performed by: OBSTETRICS & GYNECOLOGY

## 2020-05-07 PROCEDURE — 99999 PR PBB SHADOW E&M-EST. PATIENT-LVL III: ICD-10-PCS | Mod: PBBFAC,,, | Performed by: OBSTETRICS & GYNECOLOGY

## 2020-05-07 PROCEDURE — 99213 OFFICE O/P EST LOW 20 MIN: CPT | Mod: S$PBB,,, | Performed by: OBSTETRICS & GYNECOLOGY

## 2020-05-07 PROCEDURE — 99999 PR PBB SHADOW E&M-EST. PATIENT-LVL III: CPT | Mod: PBBFAC,,, | Performed by: OBSTETRICS & GYNECOLOGY

## 2020-05-07 NOTE — PROGRESS NOTES
"30 y.o.   OB History        2    Para   2    Term   2            AB        Living           SAB        TAB        Ectopic        Multiple        Live Births                   Comlaining of:  Pt here for std retest  Had pos gc on urine  Was having some irritation symptmos  Gone now    ROS:  GENERAL: No fever, chills, fatigability or weight loss.  SKIN: No rashes, itching or changes in color or texture of skin.  HEAD: No headaches or recent head trauma.  EYES: Visual acuity fine. No photophobia, ocular pain or diplopia.  EARS: Denies ear pain, discharge or vertigo.  NOSE: No loss of smell, no epistaxis or postnasal drip.  MOUTH & THROAT: No hoarseness or change in voice. No excessive gum bleeding.  NODES: Denies swollen glands.  CHEST: Denies TORRES, cyanosis, wheezing, cough and sputum production.  CARDIOVASCULAR: Denies chest pain, PND, orthopnea or reduced exercise tolerance.  ABDOMEN: Appetite fine. No weight loss. Denies diarrhea, abdominal pain, hematemesis or blood in stool.  URINARY: No flank pain, dysuria or hematuria.  PERIPHERAL VASCULAR: No claudication or cyanosis.  MUSCULOSKELETAL: No joint stiffness or swelling. Denies back pain.  NEUROLOGIC: No history of seizures, paralysis, alteration of gait or coordination      PE: /67   Ht 5' 1" (1.549 m)   Wt 84.9 kg (187 lb 3.2 oz)   LMP 2020 (Exact Date)   BMI 35.37 kg/m²    Add soft  Pelvic nromal  cutlure done      A prev std  Plan, call with results  Needs pap , can do summer        "

## 2020-05-12 ENCOUNTER — PATIENT MESSAGE (OUTPATIENT)
Dept: OBSTETRICS AND GYNECOLOGY | Facility: CLINIC | Age: 30
End: 2020-05-12

## 2020-05-12 LAB
C TRACH DNA SPEC QL NAA+PROBE: NOT DETECTED
N GONORRHOEA DNA SPEC QL NAA+PROBE: NOT DETECTED

## 2020-05-13 ENCOUNTER — PATIENT MESSAGE (OUTPATIENT)
Dept: OBSTETRICS AND GYNECOLOGY | Facility: CLINIC | Age: 30
End: 2020-05-13

## 2020-05-13 ENCOUNTER — TELEPHONE (OUTPATIENT)
Dept: OBSTETRICS AND GYNECOLOGY | Facility: CLINIC | Age: 30
End: 2020-05-13

## 2020-05-31 RX ORDER — NORELGESTROMIN AND ETHINYL ESTRADIOL 150; 35 UG/D; UG/D
PATCH TRANSDERMAL
Qty: 3 PATCH | Refills: 2 | Status: SHIPPED | OUTPATIENT
Start: 2020-05-31 | End: 2020-08-23

## 2020-11-17 ENCOUNTER — OFFICE VISIT (OUTPATIENT)
Dept: URGENT CARE | Facility: CLINIC | Age: 30
End: 2020-11-17
Payer: MEDICAID

## 2020-11-17 VITALS
DIASTOLIC BLOOD PRESSURE: 101 MMHG | HEART RATE: 67 BPM | HEIGHT: 61 IN | WEIGHT: 178 LBS | BODY MASS INDEX: 33.61 KG/M2 | TEMPERATURE: 99 F | SYSTOLIC BLOOD PRESSURE: 146 MMHG | RESPIRATION RATE: 16 BRPM | OXYGEN SATURATION: 99 %

## 2020-11-17 DIAGNOSIS — H65.191 ACUTE EFFUSION OF RIGHT EAR: Primary | ICD-10-CM

## 2020-11-17 PROCEDURE — 99213 PR OFFICE/OUTPT VISIT, EST, LEVL III, 20-29 MIN: ICD-10-PCS | Mod: S$GLB,,, | Performed by: PHYSICIAN ASSISTANT

## 2020-11-17 PROCEDURE — 99213 OFFICE O/P EST LOW 20 MIN: CPT | Mod: S$GLB,,, | Performed by: PHYSICIAN ASSISTANT

## 2020-11-17 RX ORDER — LORATADINE 10 MG/1
10 TABLET ORAL DAILY
Qty: 30 TABLET | Refills: 0 | Status: SHIPPED | OUTPATIENT
Start: 2020-11-17 | End: 2020-12-17

## 2020-11-17 RX ORDER — FLUTICASONE PROPIONATE 50 MCG
1 SPRAY, SUSPENSION (ML) NASAL DAILY
Qty: 18.2 ML | Refills: 0 | Status: SHIPPED | OUTPATIENT
Start: 2020-11-17

## 2020-11-17 RX ORDER — IBUPROFEN 600 MG/1
600 TABLET ORAL EVERY 6 HOURS PRN
Qty: 20 TABLET | Refills: 0 | Status: SHIPPED | OUTPATIENT
Start: 2020-11-17 | End: 2020-11-22

## 2020-11-17 RX ORDER — KETOROLAC TROMETHAMINE 30 MG/ML
30 INJECTION, SOLUTION INTRAMUSCULAR; INTRAVENOUS
Status: COMPLETED | OUTPATIENT
Start: 2020-11-17 | End: 2020-11-17

## 2020-11-17 RX ORDER — PSEUDOEPHEDRINE HCL 120 MG/1
120 TABLET, FILM COATED, EXTENDED RELEASE ORAL DAILY
Qty: 7 TABLET | Refills: 0 | Status: SHIPPED | OUTPATIENT
Start: 2020-11-17 | End: 2020-11-24

## 2020-11-17 RX ADMIN — KETOROLAC TROMETHAMINE 30 MG: 30 INJECTION, SOLUTION INTRAMUSCULAR; INTRAVENOUS at 10:11

## 2020-11-18 NOTE — PATIENT INSTRUCTIONS
PLEASE READ YOUR DISCHARGE INSTRUCTIONS ENTIRELY AS IT CONTAINS IMPORTANT INFORMATION.  You received an injection of a powerful NSAID today (Toradol).  Its effects will last up to 24 hours.  Please do not take another NSAID (ie aspirin, ibuprofen, Aleve, Advil or Motrin) until this time tomorrow.  If you continue to have pain, you may take Tylenol (acetaminophen) if you are not allergic to this medication.    - Rest.    - Drink plenty of fluids.    - Tylenol or Ibuprofen as directed as needed for fever/pain.    - If you were prescribed antibiotics, please take them to completion.  - If you are female and on birth control pills - please use additional methods of contraception to prevent pregnancy while on antibiotics and for one cycle after.   - If you were prescribed a narcotic medication or muscle relaxer, do not drive or operate heavy equipment or machinery while taking these medications, as they can cause drowsiness.   - If you smoke, please stop smoking.  -You must understand that you've received an Urgent Care treatment only and that you may be released before all your medical problems are known or treated. You, the patient, will    arrange for follow up care as instructed. Please arrange follow up with your primary medical clinic as soon as possible.   - Follow up with your PCP or specialty clinic as directed in the next 1-2 weeks if not improved or as needed.  You can call [phone] to schedule an appointment with the appropriate provider.    - Please return to Urgent Care or to the Emergency Department if your symptoms worsen.    Patient aware and verbalized understanding.    Earache, No Infection (Adult)  Earaches can happen without an infection. This occurs when air and fluid build up behind the eardrum causing a feeling of fullness and discomfort and reduced hearing. This is called otitis media with effusion (OME) or serous otitis media. It means there is fluid in the middle ear. It is not the same  as acute otitis media, which is typically from infection.  OME can happen when you have a cold if congestion blocks the passage that drains the middle ear. This passage is called the eustachian tube. OME may also occur with nasal allergies or after a bacterial middle ear infection.    The pain or discomfort may come and go. You may hear clicking or popping sounds when you chew or swallow. You may feel that your balance is off. Or you may hear ringing in the ear.  It often takes from several weeks up to 3 months for the fluid to clear on its own. Oral pain relievers and ear drops help if there is pain. Decongestants and antihistamines sometimes help. Antibiotics don't help since there is no infection. Your doctor may prescribe a nasal spray to help reduce swelling in the nose and eustachian tube. This can allow the ear to drain.  If your OME doesn't improve after 3 months, surgery may be used to drain the fluid and insert a small tube in the eardrum to allow continued drainage.  Because the middle ear fluid can become infected, it is important to watch for signs of an ear infection which may develop later. These signs include increased ear pain, fever, or drainage from the ear.  Home care  The following guidelines will help you care for yourself at home:  · You may use over-the-counter medicine as directed to control pain, unless another medicine was prescribed. If you have chronic liver or kidney disease or ever had a stomach ulcer or GI bleeding, talk with your doctor before using these medicines. Aspirin should never be used in anyone under 18 years of age who is ill with a fever. It may cause severe liver damage.  · You may use over-the-counter decongestants such as phenylephrine or pseudoephedrine. But they are not always helpful. Don't use nasal spray decongestants more than 3 days. Longer use can make congestion worse. Prescription nasal sprays from your doctor don't typically have those  restrictions.  · Antihistamines may help if you are also having allergy symptoms.  · You may use medicines such as guaifenesin to thin mucus and promote drainage.  Follow-up care  Follow up with your healthcare provider or as advised if you are not feeling better after 3 days.  When to seek medical advice  Call your healthcare provider right away if any of the following occur:  · Your ear pain gets worse or does not start to improve   · Fever of 100.4°F (38°C) or higher, or as directed by your healthcare provider  · Fluid or blood draining from the ear  · Headache or sinus pain  · Stiff neck  · Unusual drowsiness or confusion  Date Last Reviewed: 10/1/2016  © 2913-0493 [employer]. [address] [place] [address], [place] [address]. All rights reserved. This information is not intended as a substitute for professional medical care. Always follow your healthcare professional's instructions.

## 2020-11-18 NOTE — PROGRESS NOTES
"Subjective:       Patient ID: Alicia Leung is a 30 y.o. female.    Vitals:  height is 5' 1" (1.549 m) and weight is 80.7 kg (178 lb). Her temperature is 98.5 °F (36.9 °C). Her blood pressure is 146/101 (abnormal) and her pulse is 67. Her respiration is 16 and oxygen saturation is 99%.     Chief Complaint: Otalgia (Right Ear Pain )    Ms. Leung presents for evaluation of right ear pain.  She has had the symptoms since yesterday.  She denies any drainage or hearing changes.  She denies any fevers, chills, cough, headache, sore throat, congestion, shortness of breath, chest pain, leg swelling, nausea, vomiting, diarrhea. She has taken ibuprofen & tylenol for symptoms with some relief.         Otalgia   There is pain in the right ear. This is a new problem. The current episode started yesterday. The problem occurs constantly. The problem has been gradually worsening. There has been no fever. The pain is at a severity of 10/10. The pain is severe. Pertinent negatives include no coughing, ear discharge, headaches, rash, sore throat or vomiting. She has tried NSAIDs (Motrin and Swimmers Ear Drops) for the symptoms. The treatment provided no relief.       Constitution: Negative for chills, sweating, fatigue and fever.   HENT: Positive for ear pain. Negative for ear discharge, congestion, sinus pain, sinus pressure, sore throat and voice change.    Neck: Negative for painful lymph nodes.   Cardiovascular: Negative for chest trauma, chest pain, leg swelling, palpitations, sob on exertion and passing out.   Eyes: Negative for eye redness.   Respiratory: Negative for chest tightness, cough, sputum production, bloody sputum, COPD, shortness of breath, stridor, wheezing and asthma.    Gastrointestinal: Negative for nausea and vomiting.   Musculoskeletal: Negative for muscle ache.   Skin: Negative for rash.   Allergic/Immunologic: Negative for seasonal allergies and asthma.   Neurological: Negative for dizziness, history of " vertigo, light-headedness, passing out, facial drooping, speech difficulty, coordination disturbances, loss of balance and headaches.   Hematologic/Lymphatic: Negative for swollen lymph nodes.       Objective:      Physical Exam   Constitutional: She is oriented to person, place, and time. She appears well-developed. She is cooperative.  Non-toxic appearance. She does not appear ill. No distress.   HENT:   Head: Normocephalic and atraumatic.   Ears:   Right Ear: Hearing, external ear and ear canal normal. Tympanic membrane is bulging. Tympanic membrane is not erythematous. A middle ear effusion is present.   Left Ear: Hearing, tympanic membrane, external ear and ear canal normal. Tympanic membrane is not erythematous and not bulging.  No middle ear effusion.   Nose: Nose normal. No mucosal edema, rhinorrhea or nasal deformity. No epistaxis. Right sinus exhibits no maxillary sinus tenderness and no frontal sinus tenderness. Left sinus exhibits no maxillary sinus tenderness and no frontal sinus tenderness.   Mouth/Throat: Uvula is midline, oropharynx is clear and moist and mucous membranes are normal. No trismus in the jaw. Normal dentition. No uvula swelling. No oropharyngeal exudate, posterior oropharyngeal edema or posterior oropharyngeal erythema.   Eyes: Conjunctivae and lids are normal. No scleral icterus.   Neck: Trachea normal, full passive range of motion without pain and phonation normal. Neck supple. No neck rigidity. No edema and no erythema present.   Cardiovascular: Normal rate, regular rhythm, normal heart sounds and normal pulses.   Pulmonary/Chest: Effort normal and breath sounds normal. No stridor. No respiratory distress. She has no decreased breath sounds. She has no wheezes. She has no rhonchi. She has no rales.   Abdominal: Normal appearance.   Musculoskeletal: Normal range of motion.         General: No deformity.   Neurological: She is alert and oriented to person, place, and time. She exhibits  normal muscle tone. Coordination normal.   Skin: Skin is warm, dry, intact, not diaphoretic and not pale. Psychiatric: Her speech is normal and behavior is normal. Judgment and thought content normal.   Nursing note and vitals reviewed.        Assessment:       1. Acute effusion of right ear        Plan:         Acute effusion of right ear    Other orders  -     ketorolac injection 30 mg  -     ibuprofen (ADVIL,MOTRIN) 600 MG tablet; Take 1 tablet (600 mg total) by mouth every 6 (six) hours as needed for Pain.  Dispense: 20 tablet; Refill: 0  -     fluticasone propionate (FLONASE) 50 mcg/actuation nasal spray; 1 spray (50 mcg total) by Each Nostril route once daily.  Dispense: 18.2 mL; Refill: 0  -     loratadine (CLARITIN) 10 mg tablet; Take 1 tablet (10 mg total) by mouth once daily.  Dispense: 30 tablet; Refill: 0  -     pseudoephedrine (SUDAFED 12 HOUR) 120 mg 12 hr tablet; Take 1 tablet (120 mg total) by mouth once daily. for 7 days  Dispense: 7 tablet; Refill: 0    Diagnoses and plan discussed with the patient, as well as the expected course and duration of her symptoms. All questions and concerns were addressed prior to discharge.  She was advised to follow up with her PCP within 1 week if symptoms do not improve. Emergency department precautions were given. Patient verbalized understanding and was happy with the plan of care.     Patient Instructions   PLEASE READ YOUR DISCHARGE INSTRUCTIONS ENTIRELY AS IT CONTAINS IMPORTANT INFORMATION.  You received an injection of a powerful NSAID today (Toradol).  Its effects will last up to 24 hours.  Please do not take another NSAID (ie aspirin, ibuprofen, Aleve, Advil or Motrin) until this time tomorrow.  If you continue to have pain, you may take Tylenol (acetaminophen) if you are not allergic to this medication.    - Rest.    - Drink plenty of fluids.    - Tylenol or Ibuprofen as directed as needed for fever/pain.    - If you were prescribed antibiotics, please take  them to completion.  - If you are female and on birth control pills - please use additional methods of contraception to prevent pregnancy while on antibiotics and for one cycle after.   - If you were prescribed a narcotic medication or muscle relaxer, do not drive or operate heavy equipment or machinery while taking these medications, as they can cause drowsiness.   - If you smoke, please stop smoking.  -You must understand that you've received an Urgent Care treatment only and that you may be released before all your medical problems are known or treated. You, the patient, will    arrange for follow up care as instructed. Please arrange follow up with your primary medical clinic as soon as possible.   - Follow up with your PCP or specialty clinic as directed in the next 1-2 weeks if not improved or as needed.  You can call [phone] to schedule an appointment with the appropriate provider.    - Please return to Urgent Care or to the Emergency Department if your symptoms worsen.    Patient aware and verbalized understanding.    Earache, No Infection (Adult)  Earaches can happen without an infection. This occurs when air and fluid build up behind the eardrum causing a feeling of fullness and discomfort and reduced hearing. This is called otitis media with effusion (OME) or serous otitis media. It means there is fluid in the middle ear. It is not the same as acute otitis media, which is typically from infection.  OME can happen when you have a cold if congestion blocks the passage that drains the middle ear. This passage is called the eustachian tube. OME may also occur with nasal allergies or after a bacterial middle ear infection.    The pain or discomfort may come and go. You may hear clicking or popping sounds when you chew or swallow. You may feel that your balance is off. Or you may hear ringing in the ear.  It often takes from several weeks up to 3 months for the fluid to clear on its own. Oral pain  relievers and ear drops help if there is pain. Decongestants and antihistamines sometimes help. Antibiotics don't help since there is no infection. Your doctor may prescribe a nasal spray to help reduce swelling in the nose and eustachian tube. This can allow the ear to drain.  If your OME doesn't improve after 3 months, surgery may be used to drain the fluid and insert a small tube in the eardrum to allow continued drainage.  Because the middle ear fluid can become infected, it is important to watch for signs of an ear infection which may develop later. These signs include increased ear pain, fever, or drainage from the ear.  Home care  The following guidelines will help you care for yourself at home:  · You may use over-the-counter medicine as directed to control pain, unless another medicine was prescribed. If you have chronic liver or kidney disease or ever had a stomach ulcer or GI bleeding, talk with your doctor before using these medicines. Aspirin should never be used in anyone under 18 years of age who is ill with a fever. It may cause severe liver damage.  · You may use over-the-counter decongestants such as phenylephrine or pseudoephedrine. But they are not always helpful. Don't use nasal spray decongestants more than 3 days. Longer use can make congestion worse. Prescription nasal sprays from your doctor don't typically have those restrictions.  · Antihistamines may help if you are also having allergy symptoms.  · You may use medicines such as guaifenesin to thin mucus and promote drainage.  Follow-up care  Follow up with your healthcare provider or as advised if you are not feeling better after 3 days.  When to seek medical advice  Call your healthcare provider right away if any of the following occur:  · Your ear pain gets worse or does not start to improve   · Fever of 100.4°F (38°C) or higher, or as directed by your healthcare provider  · Fluid or blood draining from the ear  · Headache or sinus  pain  · Stiff neck  · Unusual drowsiness or confusion  Date Last Reviewed: 10/1/2016  © 8272-1992 [employer], [employer] [address] [place] [address], [place] [address]. All rights reserved. This information is not intended as a substitute for professional medical care. Always follow your healthcare professional's instructions.

## 2021-03-03 ENCOUNTER — HOSPITAL ENCOUNTER (OUTPATIENT)
Dept: RADIOLOGY | Facility: HOSPITAL | Age: 31
Discharge: HOME | End: 2021-03-03
Attending: PHYSICIAN ASSISTANT
Payer: MEDICAID

## 2021-03-03 ENCOUNTER — OFFICE VISIT (OUTPATIENT)
Dept: ORTHOPEDICS | Facility: CLINIC | Age: 31
End: 2021-03-03
Payer: MEDICAID

## 2021-03-03 ENCOUNTER — TELEPHONE (OUTPATIENT)
Dept: ORTHOPEDICS | Facility: CLINIC | Age: 31
End: 2021-03-03

## 2021-03-03 VITALS
BODY MASS INDEX: 33.71 KG/M2 | SYSTOLIC BLOOD PRESSURE: 121 MMHG | HEART RATE: 82 BPM | HEIGHT: 61 IN | WEIGHT: 178.56 LBS | DIASTOLIC BLOOD PRESSURE: 79 MMHG

## 2021-03-03 DIAGNOSIS — M77.51 TENDINITIS OF RIGHT ANKLE: Primary | ICD-10-CM

## 2021-03-03 DIAGNOSIS — M25.471 ANKLE EFFUSION, RIGHT: ICD-10-CM

## 2021-03-03 DIAGNOSIS — M25.571 RIGHT ANKLE PAIN, UNSPECIFIED CHRONICITY: ICD-10-CM

## 2021-03-03 DIAGNOSIS — M25.571 RIGHT ANKLE PAIN, UNSPECIFIED CHRONICITY: Primary | ICD-10-CM

## 2021-03-03 PROCEDURE — 99213 OFFICE O/P EST LOW 20 MIN: CPT | Mod: PBBFAC,25,PN | Performed by: PHYSICIAN ASSISTANT

## 2021-03-03 PROCEDURE — 99203 OFFICE O/P NEW LOW 30 MIN: CPT | Mod: S$PBB,,, | Performed by: PHYSICIAN ASSISTANT

## 2021-03-03 PROCEDURE — 99999 PR PBB SHADOW E&M-EST. PATIENT-LVL III: ICD-10-PCS | Mod: PBBFAC,,, | Performed by: PHYSICIAN ASSISTANT

## 2021-03-03 PROCEDURE — 99203 PR OFFICE/OUTPT VISIT, NEW, LEVL III, 30-44 MIN: ICD-10-PCS | Mod: S$PBB,,, | Performed by: PHYSICIAN ASSISTANT

## 2021-03-03 PROCEDURE — 73610 XR ANKLE COMPLETE 3 VIEW RIGHT: ICD-10-PCS | Mod: 26,RT,, | Performed by: RADIOLOGY

## 2021-03-03 PROCEDURE — 73610 X-RAY EXAM OF ANKLE: CPT | Mod: 26,RT,, | Performed by: RADIOLOGY

## 2021-03-03 PROCEDURE — 73610 X-RAY EXAM OF ANKLE: CPT | Mod: TC,FY,RT

## 2021-03-03 PROCEDURE — 99999 PR PBB SHADOW E&M-EST. PATIENT-LVL III: CPT | Mod: PBBFAC,,, | Performed by: PHYSICIAN ASSISTANT

## 2021-03-03 RX ORDER — METHYLPREDNISOLONE 4 MG/1
TABLET ORAL
Qty: 1 PACKAGE | Refills: 0 | Status: SHIPPED | OUTPATIENT
Start: 2021-03-03 | End: 2021-03-24

## 2021-03-05 ENCOUNTER — TELEPHONE (OUTPATIENT)
Dept: OBSTETRICS AND GYNECOLOGY | Facility: CLINIC | Age: 31
End: 2021-03-05

## 2021-03-05 ENCOUNTER — PATIENT MESSAGE (OUTPATIENT)
Dept: OBSTETRICS AND GYNECOLOGY | Facility: CLINIC | Age: 31
End: 2021-03-05

## 2021-03-05 ENCOUNTER — LAB VISIT (OUTPATIENT)
Dept: LAB | Facility: HOSPITAL | Age: 31
End: 2021-03-05
Attending: OBSTETRICS & GYNECOLOGY
Payer: MEDICAID

## 2021-03-05 DIAGNOSIS — R30.0 DYSURIA: ICD-10-CM

## 2021-03-05 DIAGNOSIS — R30.0 DYSURIA: Primary | ICD-10-CM

## 2021-03-05 PROCEDURE — 87086 URINE CULTURE/COLONY COUNT: CPT | Performed by: OBSTETRICS & GYNECOLOGY

## 2021-03-06 LAB — BACTERIA UR CULT: NO GROWTH

## 2021-04-09 ENCOUNTER — PATIENT MESSAGE (OUTPATIENT)
Dept: OBSTETRICS AND GYNECOLOGY | Facility: CLINIC | Age: 31
End: 2021-04-09

## 2021-04-11 RX ORDER — NORELGESTROMIN AND ETHINYL ESTRADIOL 150; 35 UG/D; UG/D
1 PATCH TRANSDERMAL WEEKLY
Qty: 3 PATCH | Refills: 1 | Status: SHIPPED | OUTPATIENT
Start: 2021-04-11 | End: 2021-05-18 | Stop reason: SDUPTHER

## 2021-04-15 ENCOUNTER — PATIENT MESSAGE (OUTPATIENT)
Dept: RESEARCH | Facility: HOSPITAL | Age: 31
End: 2021-04-15

## 2021-05-18 RX ORDER — NORELGESTROMIN AND ETHINYL ESTRADIOL 150; 35 UG/D; UG/D
1 PATCH TRANSDERMAL WEEKLY
Qty: 3 PATCH | Refills: 1 | Status: SHIPPED | OUTPATIENT
Start: 2021-05-18 | End: 2021-06-22 | Stop reason: SDUPTHER

## 2021-06-22 ENCOUNTER — OFFICE VISIT (OUTPATIENT)
Dept: OBSTETRICS AND GYNECOLOGY | Facility: CLINIC | Age: 31
End: 2021-06-22
Payer: MEDICAID

## 2021-06-22 VITALS
BODY MASS INDEX: 32.93 KG/M2 | WEIGHT: 174.25 LBS | DIASTOLIC BLOOD PRESSURE: 70 MMHG | SYSTOLIC BLOOD PRESSURE: 100 MMHG

## 2021-06-22 DIAGNOSIS — Z01.419 WELL WOMAN EXAM WITH ROUTINE GYNECOLOGICAL EXAM: Primary | ICD-10-CM

## 2021-06-22 PROCEDURE — 99999 PR PBB SHADOW E&M-EST. PATIENT-LVL II: ICD-10-PCS | Mod: PBBFAC,,, | Performed by: OBSTETRICS & GYNECOLOGY

## 2021-06-22 PROCEDURE — 99999 PR PBB SHADOW E&M-EST. PATIENT-LVL II: CPT | Mod: PBBFAC,,, | Performed by: OBSTETRICS & GYNECOLOGY

## 2021-06-22 PROCEDURE — 99212 OFFICE O/P EST SF 10 MIN: CPT | Mod: PBBFAC,PO | Performed by: OBSTETRICS & GYNECOLOGY

## 2021-06-22 PROCEDURE — 88175 CYTOPATH C/V AUTO FLUID REDO: CPT | Performed by: OBSTETRICS & GYNECOLOGY

## 2021-06-22 PROCEDURE — 99395 PREV VISIT EST AGE 18-39: CPT | Mod: S$PBB,,, | Performed by: OBSTETRICS & GYNECOLOGY

## 2021-06-22 PROCEDURE — 99395 PR PREVENTIVE VISIT,EST,18-39: ICD-10-PCS | Mod: S$PBB,,, | Performed by: OBSTETRICS & GYNECOLOGY

## 2021-06-22 RX ORDER — NORELGESTROMIN AND ETHINYL ESTRADIOL 150; 35 UG/D; UG/D
1 PATCH TRANSDERMAL WEEKLY
Qty: 3 PATCH | Refills: 12 | Status: SHIPPED | OUTPATIENT
Start: 2021-06-22 | End: 2022-10-18 | Stop reason: SDUPTHER

## 2021-06-28 LAB
FINAL PATHOLOGIC DIAGNOSIS: NORMAL
Lab: NORMAL

## 2021-08-23 ENCOUNTER — TELEPHONE (OUTPATIENT)
Dept: PRIMARY CARE CLINIC | Facility: CLINIC | Age: 31
End: 2021-08-23

## 2022-03-07 ENCOUNTER — TELEPHONE (OUTPATIENT)
Dept: OBSTETRICS AND GYNECOLOGY | Facility: CLINIC | Age: 32
End: 2022-03-07
Payer: MEDICAID

## 2022-03-07 ENCOUNTER — LAB VISIT (OUTPATIENT)
Dept: LAB | Facility: HOSPITAL | Age: 32
End: 2022-03-07
Attending: OBSTETRICS & GYNECOLOGY
Payer: MEDICAID

## 2022-03-07 ENCOUNTER — PATIENT MESSAGE (OUTPATIENT)
Dept: OBSTETRICS AND GYNECOLOGY | Facility: CLINIC | Age: 32
End: 2022-03-07
Payer: MEDICAID

## 2022-03-07 DIAGNOSIS — N30.00 ACUTE CYSTITIS WITHOUT HEMATURIA: Primary | ICD-10-CM

## 2022-03-07 DIAGNOSIS — N30.00 ACUTE CYSTITIS WITHOUT HEMATURIA: ICD-10-CM

## 2022-03-07 PROCEDURE — 87086 URINE CULTURE/COLONY COUNT: CPT | Performed by: OBSTETRICS & GYNECOLOGY

## 2022-03-07 RX ORDER — FLUCONAZOLE 100 MG/1
100 TABLET ORAL DAILY
Qty: 3 TABLET | Refills: 2 | Status: SHIPPED | OUTPATIENT
Start: 2022-03-07

## 2022-03-07 RX ORDER — CLOTRIMAZOLE AND BETAMETHASONE DIPROPIONATE 10; .64 MG/G; MG/G
CREAM TOPICAL
Qty: 15 G | Refills: 1 | Status: SHIPPED | OUTPATIENT
Start: 2022-03-07 | End: 2023-03-07

## 2022-03-07 NOTE — TELEPHONE ENCOUNTER
----- Message from Rachel Joseph sent at 3/7/2022  4:31 PM CST -----  Regarding: Call Back//Urine Order  Type:  Needs Medical Advice    Who Called: pt    Would the patient rather a call back or a response via [facility]? Call    Best Call Back Number: [phone]    Additional Information:  urine sample order needed

## 2022-03-07 NOTE — TELEPHONE ENCOUNTER
----- Message from Jamilah Almodovar sent at 3/7/2022  2:21 PM CST -----  Type:  Needs Medical Advice    Who Called: Pt   Symptoms (please be specific): uti  How long has patient had these symptoms:  Saturday  Pharmacy name and phone #:    Would the patient rather a call back or a response via [facility]? Call   Best Call Back Number: [phone]  Additional Information:     Pt would like to know if she can do a urine sample   Pt would like a call back

## 2022-03-09 LAB — BACTERIA UR CULT: NO GROWTH

## 2022-06-07 ENCOUNTER — TELEPHONE (OUTPATIENT)
Dept: OBSTETRICS AND GYNECOLOGY | Facility: CLINIC | Age: 32
End: 2022-06-07
Payer: MEDICAID

## 2022-06-07 NOTE — TELEPHONE ENCOUNTER
----- Message from Krista De Luna sent at 6/7/2022  2:05 PM CDT -----  Type:  Sooner Apoointment Request    Caller is requesting a sooner appointment.  Caller declined first available appointment listed below.  Caller will not accept being placed on the waitlist and is requesting a message be sent to doctor.  Name of Caller:self  When is the first available appointment 7.7.2022  Symptoms:Annual and some other issues she is having   Would the patient rather a call back or a response via [facility]? call  Best Call Back Number:[phone]  Additional Information: none
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
## 2022-06-08 ENCOUNTER — PATIENT MESSAGE (OUTPATIENT)
Dept: OBSTETRICS AND GYNECOLOGY | Facility: CLINIC | Age: 32
End: 2022-06-08
Payer: MEDICAID

## 2022-06-08 RX ORDER — METRONIDAZOLE 500 MG/1
500 TABLET ORAL 3 TIMES DAILY
Qty: 15 TABLET | Refills: 2 | Status: SHIPPED | OUTPATIENT
Start: 2022-06-08

## 2022-06-08 RX ORDER — FLUCONAZOLE 100 MG/1
100 TABLET ORAL DAILY
Qty: 3 TABLET | Refills: 0 | Status: SHIPPED | OUTPATIENT
Start: 2022-06-08 | End: 2022-06-11

## 2022-06-08 RX ORDER — CLOTRIMAZOLE AND BETAMETHASONE DIPROPIONATE 10; .64 MG/G; MG/G
CREAM TOPICAL
Qty: 15 G | Refills: 1 | Status: SHIPPED | OUTPATIENT
Start: 2022-06-08 | End: 2023-06-08

## 2022-06-08 NOTE — TELEPHONE ENCOUNTER
Called to inform patient about prescription being sent to pharmacy.  Made patient appt on July 8 at 10:15am.  Patient verbalized  Understanding.    RADHIKA Dorman

## 2022-07-08 ENCOUNTER — OFFICE VISIT (OUTPATIENT)
Dept: OBSTETRICS AND GYNECOLOGY | Facility: CLINIC | Age: 32
End: 2022-07-08
Payer: MEDICAID

## 2022-07-08 ENCOUNTER — LAB VISIT (OUTPATIENT)
Dept: LAB | Facility: HOSPITAL | Age: 32
End: 2022-07-08
Attending: OBSTETRICS & GYNECOLOGY
Payer: MEDICAID

## 2022-07-08 VITALS
BODY MASS INDEX: 33.33 KG/M2 | SYSTOLIC BLOOD PRESSURE: 122 MMHG | DIASTOLIC BLOOD PRESSURE: 82 MMHG | WEIGHT: 176.38 LBS

## 2022-07-08 DIAGNOSIS — Z01.419 WELL WOMAN EXAM WITH ROUTINE GYNECOLOGICAL EXAM: Primary | ICD-10-CM

## 2022-07-08 DIAGNOSIS — Z01.419 WELL WOMAN EXAM WITH ROUTINE GYNECOLOGICAL EXAM: ICD-10-CM

## 2022-07-08 PROCEDURE — 87491 CHLMYD TRACH DNA AMP PROBE: CPT | Mod: 59 | Performed by: OBSTETRICS & GYNECOLOGY

## 2022-07-08 PROCEDURE — 36415 COLL VENOUS BLD VENIPUNCTURE: CPT | Performed by: OBSTETRICS & GYNECOLOGY

## 2022-07-08 PROCEDURE — 86592 SYPHILIS TEST NON-TREP QUAL: CPT | Performed by: OBSTETRICS & GYNECOLOGY

## 2022-07-08 PROCEDURE — 3079F PR MOST RECENT DIASTOLIC BLOOD PRESSURE 80-89 MM HG: ICD-10-PCS | Mod: CPTII,,, | Performed by: OBSTETRICS & GYNECOLOGY

## 2022-07-08 PROCEDURE — 3008F PR BODY MASS INDEX (BMI) DOCUMENTED: ICD-10-PCS | Mod: CPTII,,, | Performed by: OBSTETRICS & GYNECOLOGY

## 2022-07-08 PROCEDURE — 1159F PR MEDICATION LIST DOCUMENTED IN MEDICAL RECORD: ICD-10-PCS | Mod: CPTII,,, | Performed by: OBSTETRICS & GYNECOLOGY

## 2022-07-08 PROCEDURE — 87801 DETECT AGNT MULT DNA AMPLI: CPT | Performed by: OBSTETRICS & GYNECOLOGY

## 2022-07-08 PROCEDURE — 1159F MED LIST DOCD IN RCRD: CPT | Mod: CPTII,,, | Performed by: OBSTETRICS & GYNECOLOGY

## 2022-07-08 PROCEDURE — 80074 ACUTE HEPATITIS PANEL: CPT | Performed by: OBSTETRICS & GYNECOLOGY

## 2022-07-08 PROCEDURE — 3079F DIAST BP 80-89 MM HG: CPT | Mod: CPTII,,, | Performed by: OBSTETRICS & GYNECOLOGY

## 2022-07-08 PROCEDURE — 3008F BODY MASS INDEX DOCD: CPT | Mod: CPTII,,, | Performed by: OBSTETRICS & GYNECOLOGY

## 2022-07-08 PROCEDURE — 3074F SYST BP LT 130 MM HG: CPT | Mod: CPTII,,, | Performed by: OBSTETRICS & GYNECOLOGY

## 2022-07-08 PROCEDURE — 1160F PR REVIEW ALL MEDS BY PRESCRIBER/CLIN PHARMACIST DOCUMENTED: ICD-10-PCS | Mod: CPTII,,, | Performed by: OBSTETRICS & GYNECOLOGY

## 2022-07-08 PROCEDURE — 99213 OFFICE O/P EST LOW 20 MIN: CPT | Mod: PBBFAC,PO | Performed by: OBSTETRICS & GYNECOLOGY

## 2022-07-08 PROCEDURE — 99395 PR PREVENTIVE VISIT,EST,18-39: ICD-10-PCS | Mod: S$PBB,,, | Performed by: OBSTETRICS & GYNECOLOGY

## 2022-07-08 PROCEDURE — 99999 PR PBB SHADOW E&M-EST. PATIENT-LVL III: CPT | Mod: PBBFAC,,, | Performed by: OBSTETRICS & GYNECOLOGY

## 2022-07-08 PROCEDURE — 87481 CANDIDA DNA AMP PROBE: CPT | Mod: 59 | Performed by: OBSTETRICS & GYNECOLOGY

## 2022-07-08 PROCEDURE — 99395 PREV VISIT EST AGE 18-39: CPT | Mod: S$PBB,,, | Performed by: OBSTETRICS & GYNECOLOGY

## 2022-07-08 PROCEDURE — 1160F RVW MEDS BY RX/DR IN RCRD: CPT | Mod: CPTII,,, | Performed by: OBSTETRICS & GYNECOLOGY

## 2022-07-08 PROCEDURE — 3074F PR MOST RECENT SYSTOLIC BLOOD PRESSURE < 130 MM HG: ICD-10-PCS | Mod: CPTII,,, | Performed by: OBSTETRICS & GYNECOLOGY

## 2022-07-08 PROCEDURE — 88175 CYTOPATH C/V AUTO FLUID REDO: CPT | Performed by: OBSTETRICS & GYNECOLOGY

## 2022-07-08 PROCEDURE — 87591 N.GONORRHOEAE DNA AMP PROB: CPT | Performed by: OBSTETRICS & GYNECOLOGY

## 2022-07-08 PROCEDURE — 87389 HIV-1 AG W/HIV-1&-2 AB AG IA: CPT | Performed by: OBSTETRICS & GYNECOLOGY

## 2022-07-08 PROCEDURE — 99999 PR PBB SHADOW E&M-EST. PATIENT-LVL III: ICD-10-PCS | Mod: PBBFAC,,, | Performed by: OBSTETRICS & GYNECOLOGY

## 2022-07-08 NOTE — PROGRESS NOTES
HPI:   32 y.o.   OB History        2    Para   2    Term   2            AB        Living           SAB        IAB        Ectopic        Multiple        Live Births                  Patient's last menstrual period was 2022 (exact date).    Patient is  here for her annual gynecologic exam.  She has no complaints.     ROS:  GENERAL: No fever, chills, fatigability or weight loss.  SKIN: No rashes, itching or changes in color or texture of skin.  HEAD: No headaches or recent head trauma.  EYES: Visual acuity fine. No photophobia, ocular pain or diplopia.  EARS: Denies ear pain, discharge or vertigo.  NOSE: No loss of smell, no epistaxis or postnasal drip.  MOUTH & THROAT: No hoarseness or change in voice. No excessive gum bleeding.  NODES: Denies swollen glands.  CHEST: Denies TORRES, cyanosis, wheezing, cough and sputum production.  CARDIOVASCULAR: Denies chest pain, PND, orthopnea or reduced exercise tolerance.  ABDOMEN: Appetite fine. No weight loss. Denies diarrhea, abdominal pain, hematemesis or blood in stool.  URINARY: No flank pain, dysuria or hematuria.  PERIPHERAL VASCULAR: No claudication or cyanosis.  MUSCULOSKELETAL: No joint stiffness or swelling. Denies back pain.  NEUROLOGIC: No history of seizures, paralysis, alteration of gait or coordination.    PE:   /82   Wt 80 kg (176 lb 6.4 oz)   LMP 2022 (Exact Date)   BMI 33.33 kg/m²   APPEARANCE: Well nourished, well developed, in no acute distress.  NECK: Neck symmetric without masses or thyromegaly.  BREASTS: Symmetrical, no skin changes or visible lesions. No palpable masses, nipple discharge or adenopathy bilaterally.  ABDOMEN: Flat. Soft. No tenderness or masses. No hepatosplenomegaly. No hernias. No CVA tenderness.  VULVA: No lesions. Normal female genitalia.  URETHRAL MEATUS: Normal size and location, no lesions, no prolapse.  URETHRA: No masses, tenderness, prolapse or scarring.  VAGINA: Moist and well rugated, no  discharge, no significant cystocele or rectocele.  CERVIX: No lesions and discharge. PAP done.  UTERUS: Normal size, regular shape, mobile, non-tender, bladder base nontender.  ADNEXA: No masses, tenderness or CDS nodularity.  ANUS PERINEUM: Normal.    PROCEDURES:  Pap smear    Assessment:  Normal Gynecologic Exam    Plan:  Mammogram and Colonoscopy if indicated by current recommendations.  Return to clinic in one year or for any problems or complaints.  On patch , doing well

## 2022-07-09 LAB — RPR SER QL: NORMAL

## 2022-07-11 LAB
HAV IGM SERPL QL IA: NEGATIVE
HBV CORE IGM SERPL QL IA: NEGATIVE
HBV SURFACE AG SERPL QL IA: NEGATIVE
HCV AB SERPL QL IA: NEGATIVE
HIV 1+2 AB+HIV1 P24 AG SERPL QL IA: NEGATIVE

## 2022-07-13 LAB
BACTERIAL VAGINOSIS DNA: NEGATIVE
CANDIDA GLABRATA DNA: NEGATIVE
CANDIDA KRUSEI DNA: NEGATIVE
CANDIDA RRNA VAG QL PROBE: NEGATIVE
T VAGINALIS RRNA GENITAL QL PROBE: NEGATIVE

## 2022-07-14 LAB
C TRACH DNA SPEC QL NAA+PROBE: NOT DETECTED
N GONORRHOEA DNA SPEC QL NAA+PROBE: NOT DETECTED

## 2022-07-15 LAB
FINAL PATHOLOGIC DIAGNOSIS: NORMAL
Lab: NORMAL

## 2023-02-23 ENCOUNTER — HOSPITAL ENCOUNTER (EMERGENCY)
Facility: OTHER | Age: 33
Discharge: HOME | End: 2023-02-23
Attending: EMERGENCY MEDICINE
Payer: MEDICAID

## 2023-02-23 VITALS
BODY MASS INDEX: 33.04 KG/M2 | RESPIRATION RATE: 18 BRPM | HEIGHT: 61 IN | DIASTOLIC BLOOD PRESSURE: 86 MMHG | TEMPERATURE: 98 F | SYSTOLIC BLOOD PRESSURE: 146 MMHG | HEART RATE: 66 BPM | OXYGEN SATURATION: 99 % | WEIGHT: 175 LBS

## 2023-02-23 DIAGNOSIS — S01.01XA LACERATION OF SCALP, INITIAL ENCOUNTER: Primary | ICD-10-CM

## 2023-02-23 DIAGNOSIS — S60.221A CONTUSION OF RIGHT HAND, INITIAL ENCOUNTER: ICD-10-CM
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
## 2023-02-23 DIAGNOSIS — S09.90XA INJURY OF HEAD, INITIAL ENCOUNTER: ICD-10-CM

## 2023-02-23 LAB
B-HCG UR QL: NEGATIVE
CTP QC/QA: YES

## 2023-02-23 PROCEDURE — 12002 RPR S/N/AX/GEN/TRNK2.6-7.5CM: CPT

## 2023-02-23 PROCEDURE — 96372 THER/PROPH/DIAG INJ SC/IM: CPT | Mod: 59 | Performed by: EMERGENCY MEDICINE

## 2023-02-23 PROCEDURE — 63600175 PHARM REV CODE 636 W HCPCS: Performed by: EMERGENCY MEDICINE

## 2023-02-23 PROCEDURE — 99284 EMERGENCY DEPT VISIT MOD MDM: CPT | Mod: 25

## 2023-02-23 PROCEDURE — 25000003 PHARM REV CODE 250: Performed by: EMERGENCY MEDICINE

## 2023-02-23 PROCEDURE — 90471 IMMUNIZATION ADMIN: CPT | Performed by: EMERGENCY MEDICINE

## 2023-02-23 PROCEDURE — 90715 TDAP VACCINE 7 YRS/> IM: CPT | Performed by: EMERGENCY MEDICINE

## 2023-02-23 PROCEDURE — 81025 URINE PREGNANCY TEST: CPT | Performed by: EMERGENCY MEDICINE

## 2023-02-23 RX ORDER — OXYCODONE AND ACETAMINOPHEN 5; 325 MG/1; MG/1
1 TABLET ORAL EVERY 6 HOURS PRN
Qty: 12 TABLET | Refills: 0 | Status: SHIPPED | OUTPATIENT
Start: 2023-02-23

## 2023-02-23 RX ORDER — LIDOCAINE HYDROCHLORIDE 10 MG/ML
5 INJECTION INFILTRATION; PERINEURAL
Status: COMPLETED | OUTPATIENT
Start: 2023-02-23 | End: 2023-02-23

## 2023-02-23 RX ORDER — ACETAMINOPHEN 500 MG
1000 TABLET ORAL
Status: COMPLETED | OUTPATIENT
Start: 2023-02-23 | End: 2023-02-23

## 2023-02-23 RX ORDER — KETOROLAC TROMETHAMINE 30 MG/ML
10 INJECTION, SOLUTION INTRAMUSCULAR; INTRAVENOUS
Status: COMPLETED | OUTPATIENT
Start: 2023-02-23 | End: 2023-02-23

## 2023-02-23 RX ORDER — IBUPROFEN 600 MG/1
600 TABLET ORAL EVERY 6 HOURS PRN
Qty: 20 TABLET | Refills: 0 | Status: SHIPPED | OUTPATIENT
Start: 2023-02-23 | End: 2023-03-13 | Stop reason: SDUPTHER

## 2023-02-23 RX ADMIN — LIDOCAINE HYDROCHLORIDE 5 ML: 10 INJECTION, SOLUTION INFILTRATION; PERINEURAL at 03:02

## 2023-02-23 RX ADMIN — TETANUS TOXOID, REDUCED DIPHTHERIA TOXOID AND ACELLULAR PERTUSSIS VACCINE, ADSORBED 0.5 ML: 5; 2.5; 8; 8; 2.5 SUSPENSION INTRAMUSCULAR at 04:02

## 2023-02-23 RX ADMIN — KETOROLAC TROMETHAMINE 10 MG: 30 INJECTION, SOLUTION INTRAMUSCULAR; INTRAVENOUS at 03:02

## 2023-02-23 RX ADMIN — Medication: at 04:02

## 2023-02-23 RX ADMIN — ACETAMINOPHEN 1000 MG: 500 TABLET, FILM COATED ORAL at 03:02

## 2023-02-23 NOTE — Clinical Note
"Alicia Nevarezney" Madhu was seen and treated in our emergency department on 2/23/2023.  She may return to work on 02/27/2023.       If you have any questions or concerns, please don't hesitate to call.      Home Dockery MD"
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
## 2023-02-23 NOTE — DISCHARGE INSTRUCTIONS
Mrs. Leung,    Thank you for letting me care for you today! It was nice meeting you, and I hope you feel better soon.   If you would like access to your chart and what was done today please utilize the [facility].   Please come back to [facility] for all of your future medical needs.    Our goal in the emergency department is to always give you outstanding care and exceptional service. You may receive a survey by mail or e-mail in the next week regarding your experience in our ED. We would greatly appreciate you completing and returning the survey. Your feedback provides us with a way to recognize our staff who give very good care and it helps us learn how to improve when your experience was below our aspiration of excellence.     Sincerely,    Home Dockery MD  Board Certified Emergency Physician

## 2023-03-13 ENCOUNTER — HOSPITAL ENCOUNTER (EMERGENCY)
Facility: OTHER | Age: 33
Discharge: HOME | End: 2023-03-14
Attending: EMERGENCY MEDICINE
Payer: MEDICAID

## 2023-03-13 DIAGNOSIS — G44.319 ACUTE POST-TRAUMATIC HEADACHE, NOT INTRACTABLE: ICD-10-CM

## 2023-03-13 DIAGNOSIS — Z48.02: Primary | ICD-10-CM

## 2023-03-13 PROCEDURE — 99283 EMERGENCY DEPT VISIT LOW MDM: CPT

## 2023-03-13 RX ORDER — IBUPROFEN 600 MG/1
600 TABLET ORAL EVERY 6 HOURS PRN
Qty: 20 TABLET | Refills: 0 | Status: SHIPPED | OUTPATIENT
Start: 2023-03-13

## 2023-03-14 ENCOUNTER — PATIENT OUTREACH (OUTPATIENT)
Dept: EMERGENCY MEDICINE | Facility: OTHER | Age: 33
End: 2023-03-14
Payer: MEDICAID

## 2023-03-14 ENCOUNTER — TELEPHONE (OUTPATIENT)
Dept: ADMINISTRATIVE | Facility: OTHER | Age: 33
End: 2023-03-14
Payer: MEDICAID

## 2023-03-14 VITALS
WEIGHT: 175 LBS | RESPIRATION RATE: 18 BRPM | TEMPERATURE: 99 F | HEIGHT: 61 IN | OXYGEN SATURATION: 100 % | HEART RATE: 70 BPM | SYSTOLIC BLOOD PRESSURE: 154 MMHG | BODY MASS INDEX: 33.04 KG/M2 | DIASTOLIC BLOOD PRESSURE: 98 MMHG

## 2023-03-14 NOTE — TELEPHONE ENCOUNTER
Spoke to patient who declined scheduling Neurology appointment at this time. She states she will call when she is ready to schedule.
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
## 2023-07-24 ENCOUNTER — PATIENT MESSAGE (OUTPATIENT)
Dept: OBSTETRICS AND GYNECOLOGY | Facility: CLINIC | Age: 33
End: 2023-07-24
Payer: MEDICAID

## 2023-08-11 ENCOUNTER — TELEPHONE (OUTPATIENT)
Dept: OBSTETRICS AND GYNECOLOGY | Facility: CLINIC | Age: 33
End: 2023-08-11
Payer: MEDICAID

## 2023-08-11 NOTE — TELEPHONE ENCOUNTER
----- Message from Mary Ellen Khalil sent at 8/11/2023  9:44 AM CDT -----  Type:  Sooner Apoointment Request    Caller is requesting a sooner appointment.  Caller declined first available appointment listed below.  Caller will not accept being placed on the waitlist and is requesting a message be sent to doctor.  Name of Caller: Alicia Abraham  When is the first available appointment? N/A  Symptoms: Annual  Would the patient rather a call back or a response via [facility]?  call  Best Call Back Number: [phone]  Additional Information:  Pt would like a call back from Rn regarding medication refills.

## 2023-08-23 RX ORDER — NORELGESTROMIN AND ETHINYL ESTRADIOL 150; 35 UG/D; UG/D
1 PATCH TRANSDERMAL WEEKLY
Qty: 3 PATCH | Refills: 3 | Status: SHIPPED | OUTPATIENT
Start: 2023-08-23 | End: 2023-08-25 | Stop reason: SDUPTHER

## 2023-08-25 ENCOUNTER — OFFICE VISIT (OUTPATIENT)
Dept: OBSTETRICS AND GYNECOLOGY | Facility: CLINIC | Age: 33
End: 2023-08-25
Payer: MEDICAID

## 2023-08-25 VITALS
WEIGHT: 187.06 LBS | SYSTOLIC BLOOD PRESSURE: 146 MMHG | BODY MASS INDEX: 35.34 KG/M2 | DIASTOLIC BLOOD PRESSURE: 94 MMHG

## 2023-08-25 DIAGNOSIS — Z01.419 WELL WOMAN EXAM: Primary | ICD-10-CM

## 2023-08-25 DIAGNOSIS — Z30.45 ENCOUNTER FOR SURVEILLANCE OF TRANSDERMAL PATCH HORMONAL CONTRACEPTIVE DEVICE: ICD-10-CM

## 2023-08-25 PROCEDURE — 88141 CYTOPATH C/V INTERPRET: CPT | Mod: ,,, | Performed by: PATHOLOGY

## 2023-08-25 PROCEDURE — 1160F PR REVIEW ALL MEDS BY PRESCRIBER/CLIN PHARMACIST DOCUMENTED: ICD-10-PCS | Mod: CPTII,,,

## 2023-08-25 PROCEDURE — 99999 PR PBB SHADOW E&M-EST. PATIENT-LVL III: CPT | Mod: PBBFAC,,,

## 2023-08-25 PROCEDURE — 3008F PR BODY MASS INDEX (BMI) DOCUMENTED: ICD-10-PCS | Mod: CPTII,,,

## 2023-08-25 PROCEDURE — 99213 OFFICE O/P EST LOW 20 MIN: CPT | Mod: PBBFAC,PO

## 2023-08-25 PROCEDURE — 88141 PR  CYTOPATH CERV/VAG INTERPRET: ICD-10-PCS | Mod: ,,, | Performed by: PATHOLOGY

## 2023-08-25 PROCEDURE — 3077F PR MOST RECENT SYSTOLIC BLOOD PRESSURE >= 140 MM HG: ICD-10-PCS | Mod: CPTII,,,

## 2023-08-25 PROCEDURE — 3077F SYST BP >= 140 MM HG: CPT | Mod: CPTII,,,

## 2023-08-25 PROCEDURE — 1159F MED LIST DOCD IN RCRD: CPT | Mod: CPTII,,,

## 2023-08-25 PROCEDURE — 99999 PR PBB SHADOW E&M-EST. PATIENT-LVL III: ICD-10-PCS | Mod: PBBFAC,,,

## 2023-08-25 PROCEDURE — 1159F PR MEDICATION LIST DOCUMENTED IN MEDICAL RECORD: ICD-10-PCS | Mod: CPTII,,,

## 2023-08-25 PROCEDURE — 87624 HPV HI-RISK TYP POOLED RSLT: CPT

## 2023-08-25 PROCEDURE — 99395 PR PREVENTIVE VISIT,EST,18-39: ICD-10-PCS | Mod: S$PBB,,,

## 2023-08-25 PROCEDURE — 88175 CYTOPATH C/V AUTO FLUID REDO: CPT | Performed by: PATHOLOGY

## 2023-08-25 PROCEDURE — 3080F DIAST BP >= 90 MM HG: CPT | Mod: CPTII,,,

## 2023-08-25 PROCEDURE — 3008F BODY MASS INDEX DOCD: CPT | Mod: CPTII,,,

## 2023-08-25 PROCEDURE — 99395 PREV VISIT EST AGE 18-39: CPT | Mod: S$PBB,,,

## 2023-08-25 PROCEDURE — 1160F RVW MEDS BY RX/DR IN RCRD: CPT | Mod: CPTII,,,

## 2023-08-25 PROCEDURE — 3080F PR MOST RECENT DIASTOLIC BLOOD PRESSURE >= 90 MM HG: ICD-10-PCS | Mod: CPTII,,,

## 2023-08-25 RX ORDER — NORELGESTROMIN AND ETHINYL ESTRADIOL 150; 35 UG/D; UG/D
1 PATCH TRANSDERMAL WEEKLY
Qty: 3 PATCH | Refills: 11 | Status: SHIPPED | OUTPATIENT
Start: 2023-08-25

## 2023-08-25 NOTE — PROGRESS NOTES
CC: Well woman exam    Alicia Leung is a 33 y.o. female  presents for a well woman exam.  She has no issues, problems, or complaints.    PAP:  NILM    Menstrual cycle: monthly, duration= 5-7days, not heavy, denies unmanageable pain  Contraception: patch  STD screening: declined  Denies domestic violence    Past Medical History:   Diagnosis Date    Asthma        Past Surgical History:   Procedure Laterality Date    STOMACH SURGERY         OB History    Para Term  AB Living   2 2 2         SAB IAB Ectopic Multiple Live Births                  # Outcome Date GA Lbr Yaakov/2nd Weight Sex Delivery Anes PTL Lv   2 Term      Vag-Spont      1 Term      Vag-Spont          Family History   Problem Relation Age of Onset    Hypertension Mother     Breast cancer Neg Hx     Colon cancer Neg Hx     Ovarian cancer Neg Hx        Social History     Tobacco Use    Smoking status: Never    Smokeless tobacco: Never   Substance Use Topics    Alcohol use: No     Comment: socially    Drug use: No       BP (!) 146/94   Wt 84.8 kg (187 lb 1 oz)   LMP 2023 (Approximate)   BMI 35.34 kg/m²     ROS:  GENERAL: Denies weight gain or weight loss. Feeling well overall.   SKIN: Denies rash or lesions.   HEAD: Denies head injury or headache.   ABDOMEN: No abdominal pain, constipation, diarrhea, nausea, vomiting or rectal bleeding.   URINARY: No frequency, dysuria, hematuria, or burning on urination.  REPRODUCTIVE: See HPI.   BREASTS: Denies pain, lumps, or nipple discharge.   HEMATOLOGIC: No easy bruisability or excessive bleeding.    Physical Exam:    APPEARANCE: Well nourished, well developed, in no acute distress.  AFFECT: WNL, alert and oriented x 3  SKIN: No acne or hirsutism  CHEST: Good respiratory effect  ABDOMEN: Soft.  No tenderness or masses. No hernias.  BREASTS: Symmetrical, no skin changes or visible lesions.  No palpable masses, nipple discharge bilaterally.  PELVIC: Normal external genitalia without  lesions.  Normal hair distribution.  Adequate perineal body, normal urethral meatus.  Vagina moist and well rugated without lesions or discharge.  Cervix pink, without lesions, discharge or tenderness.  No significant cystocele or rectocele.  Bimanual exam shows uterus to be normal size, regular, mobile and nontender.  Adnexa without masses or tenderness.    EXTREMITIES: No edema.    ASSESSMENT AND PLAN  1. Well woman exam  HPV High Risk Genotypes, PCR    Liquid-Based Pap Smear, Screening      2. Encounter for surveillance of transdermal patch hormonal contraceptive device  norelgestromin-ethinyl estradiol (XULANE) 150-35 mcg/24 hr        Patient was counseled today on A.C.S. Pap guidelines and recommendations for yearly pelvic exams, mammograms and monthly self breast exams; to see her PCP for other health maintenance.     Follow up in 1yr for annual exam or prn.    Patient confirms understanding of encounter and all medical questions answered.

## 2023-08-30 LAB
HPV HR 12 DNA SPEC QL NAA+PROBE: NEGATIVE
HPV16 AG SPEC QL: NEGATIVE
HPV18 DNA SPEC QL NAA+PROBE: NEGATIVE

## 2023-09-01 LAB
FINAL PATHOLOGIC DIAGNOSIS: ABNORMAL
Lab: ABNORMAL

## 2024-04-11 ENCOUNTER — PATIENT MESSAGE (OUTPATIENT)
Dept: OBSTETRICS AND GYNECOLOGY | Facility: CLINIC | Age: 34
End: 2024-04-11
Payer: MEDICAID

## 2024-08-21 ENCOUNTER — HOSPITAL ENCOUNTER (EMERGENCY)
Facility: HOSPITAL | Age: 34
Discharge: HOME | End: 2024-08-21
Attending: EMERGENCY MEDICINE
Payer: COMMERCIAL

## 2024-08-21 VITALS
OXYGEN SATURATION: 97 % | SYSTOLIC BLOOD PRESSURE: 142 MMHG | RESPIRATION RATE: 16 BRPM | HEART RATE: 57 BPM | WEIGHT: 187 LBS | TEMPERATURE: 99 F | DIASTOLIC BLOOD PRESSURE: 92 MMHG | HEIGHT: 64 IN | BODY MASS INDEX: 31.92 KG/M2
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
## 2024-08-21 LAB
B-HCG UR QL: NEGATIVE
BACTERIA #/AREA URNS AUTO: ABNORMAL /HPF
BILIRUB UR QL STRIP: NEGATIVE
CLARITY UR REFRACT.AUTO: CLEAR
COLOR UR AUTO: YELLOW
GLUCOSE UR QL STRIP: NEGATIVE
HGB UR QL STRIP: ABNORMAL
KETONES UR QL STRIP: ABNORMAL
LEUKOCYTE ESTERASE UR QL STRIP: ABNORMAL
MICROSCOPIC COMMENT: ABNORMAL
NITRITE UR QL STRIP: NEGATIVE
PH UR STRIP: 6 [PH] (ref 5–8)
PROT UR QL STRIP: ABNORMAL
RBC #/AREA URNS AUTO: 8 /HPF (ref 0–4)
SP GR UR STRIP: 1.02 (ref 1–1.03)
SQUAMOUS #/AREA URNS AUTO: 1 /HPF
URN SPEC COLLECT METH UR: ABNORMAL
WBC #/AREA URNS AUTO: 3 /HPF (ref 0–5)

## 2024-08-21 PROCEDURE — 25000003 PHARM REV CODE 250: Performed by: STUDENT IN AN ORGANIZED HEALTH CARE EDUCATION/TRAINING PROGRAM

## 2024-08-21 PROCEDURE — 81025 URINE PREGNANCY TEST: CPT | Performed by: STUDENT IN AN ORGANIZED HEALTH CARE EDUCATION/TRAINING PROGRAM

## 2024-08-21 PROCEDURE — 96374 THER/PROPH/DIAG INJ IV PUSH: CPT

## 2024-08-21 PROCEDURE — 81001 URINALYSIS AUTO W/SCOPE: CPT | Performed by: STUDENT IN AN ORGANIZED HEALTH CARE EDUCATION/TRAINING PROGRAM

## 2024-08-21 PROCEDURE — 63600175 PHARM REV CODE 636 W HCPCS: Performed by: STUDENT IN AN ORGANIZED HEALTH CARE EDUCATION/TRAINING PROGRAM

## 2024-08-21 PROCEDURE — 99285 EMERGENCY DEPT VISIT HI MDM: CPT | Mod: 25

## 2024-08-21 RX ORDER — OXYCODONE AND ACETAMINOPHEN 5; 325 MG/1; MG/1
1 TABLET ORAL EVERY 8 HOURS PRN
Qty: 6 TABLET | Refills: 0 | Status: SHIPPED | OUTPATIENT
Start: 2024-08-21 | End: 2024-08-23

## 2024-08-21 RX ORDER — HYDROMORPHONE HYDROCHLORIDE 1 MG/ML
0.5 INJECTION, SOLUTION INTRAMUSCULAR; INTRAVENOUS; SUBCUTANEOUS
Status: COMPLETED | OUTPATIENT
Start: 2024-08-21 | End: 2024-08-21

## 2024-08-21 RX ORDER — ORPHENADRINE CITRATE 100 MG/1
100 TABLET, EXTENDED RELEASE ORAL 2 TIMES DAILY
Qty: 20 TABLET | Refills: 0 | Status: SHIPPED | OUTPATIENT
Start: 2024-08-21 | End: 2024-08-31

## 2024-08-21 RX ORDER — NAPROXEN 500 MG/1
500 TABLET ORAL 2 TIMES DAILY
Qty: 20 TABLET | Refills: 0 | Status: SHIPPED | OUTPATIENT
Start: 2024-08-21 | End: 2024-08-31

## 2024-08-21 RX ORDER — ONDANSETRON 4 MG/1
4 TABLET, ORALLY DISINTEGRATING ORAL
Status: COMPLETED | OUTPATIENT
Start: 2024-08-21 | End: 2024-08-21

## 2024-08-21 RX ORDER — ACETAMINOPHEN 500 MG
1000 TABLET ORAL
Status: COMPLETED | OUTPATIENT
Start: 2024-08-21 | End: 2024-08-21

## 2024-08-21 RX ADMIN — ONDANSETRON 4 MG: 4 TABLET, ORALLY DISINTEGRATING ORAL at 05:08

## 2024-08-21 RX ADMIN — ACETAMINOPHEN 1000 MG: 500 TABLET ORAL at 05:08

## 2024-08-21 RX ADMIN — HYDROMORPHONE HYDROCHLORIDE 0.5 MG: 1 INJECTION, SOLUTION INTRAMUSCULAR; INTRAVENOUS; SUBCUTANEOUS at 05:08
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
## 2024-08-21 NOTE — Clinical Note
"Alicia Nevarezney" Madhu was seen and treated in our emergency department on 8/21/2024.  She may return to work on 08/24/2024.       If you have any questions or concerns, please don't hesitate to call.      Marlene Huang MD"
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
## 2024-10-04 ENCOUNTER — OFFICE VISIT (OUTPATIENT)
Dept: OBSTETRICS AND GYNECOLOGY | Facility: CLINIC | Age: 34
End: 2024-10-04
Payer: MEDICAID

## 2024-10-04 VITALS
HEIGHT: 64 IN | BODY MASS INDEX: 34.18 KG/M2 | WEIGHT: 200.19 LBS | DIASTOLIC BLOOD PRESSURE: 89 MMHG | SYSTOLIC BLOOD PRESSURE: 141 MMHG

## 2024-10-04 DIAGNOSIS — Z30.45 ENCOUNTER FOR SURVEILLANCE OF TRANSDERMAL PATCH HORMONAL CONTRACEPTIVE DEVICE: ICD-10-CM

## 2024-10-04 DIAGNOSIS — Z01.419 WELL WOMAN EXAM: Primary | ICD-10-CM

## 2024-10-04 PROCEDURE — 99213 OFFICE O/P EST LOW 20 MIN: CPT | Mod: PBBFAC,PO | Performed by: STUDENT IN AN ORGANIZED HEALTH CARE EDUCATION/TRAINING PROGRAM

## 2024-10-04 PROCEDURE — 99999 PR PBB SHADOW E&M-EST. PATIENT-LVL III: CPT | Mod: PBBFAC,,, | Performed by: STUDENT IN AN ORGANIZED HEALTH CARE EDUCATION/TRAINING PROGRAM

## 2024-10-04 RX ORDER — NORELGESTROMIN AND ETHINYL ESTRADIOL 35; 150 UG/MG; UG/MG
1 PATCH TRANSDERMAL WEEKLY
Qty: 3 PATCH | Refills: 11 | Status: SHIPPED | OUTPATIENT
Start: 2024-10-04

## 2024-10-04 NOTE — PROGRESS NOTES
History & Physical  Gynecology      SUBJECTIVE:     Chief Complaint: Annual Exam       History of Present Illness:  34 y.o. female  here for annual GYN visit.   She describes her periods as regular, with normal flow. She denies intermenstrual bleeding.   She denies vaginal itching, irritation, or discharge.  Patient is sexually active with 1 male partner.  She uses Xualne for contraception.  She does not use tobacco, alcohol or drugs.   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     Patient has a history of abnormal paps  Last Pap:  - ASCUS/HPV NEG    She denies a family history of breast or ovarian cancer.       Review of patient's allergies indicates:   Allergen Reactions    Hydrocodone Nausea And Vomiting       Past Medical History:   Diagnosis Date    Asthma      Past Surgical History:   Procedure Laterality Date    STOMACH SURGERY       OB History          2    Para   2    Term   2            AB        Living   2         SAB        IAB        Ectopic        Multiple        Live Births   2               Family History   Problem Relation Name Age of Onset    Hypertension Mother      Breast cancer Neg Hx      Colon cancer Neg Hx      Ovarian cancer Neg Hx       Social History     Tobacco Use    Smoking status: Never    Smokeless tobacco: Never   Substance Use Topics    Alcohol use: No     Comment: socially    Drug use: No       Current Outpatient Medications   Medication Sig    ibuprofen (ADVIL,MOTRIN) 600 MG tablet Take 1 tablet (600 mg total) by mouth every 6 (six) hours as needed for Pain.    albuterol 90 mcg/actuation inhaler Inhale 1-2 puffs into the lungs every 6 (six) hours as needed for Wheezing.    ALPRAZolam (XANAX) 0.5 MG tablet Take 1 tablet (0.5 mg total) by mouth once daily.    fluconazole (DIFLUCAN) 100 MG tablet Take 1 tablet (100 mg total) by mouth once daily. (Patient not taking: Reported on 10/4/2024)    fluticasone propionate  (FLONASE) 50 mcg/actuation nasal spray 1 spray (50 mcg total) by Each Nostril route once daily. (Patient not taking: Reported on 10/4/2024)    loratadine (CLARITIN) 10 mg tablet Take 1 tablet (10 mg total) by mouth once daily.    metroNIDAZOLE (FLAGYL) 500 MG tablet Take 1 tablet (500 mg total) by mouth 3 (three) times daily. (Patient not taking: Reported on 10/4/2024)    multivitamin-min-iron-FA-vit K 45 mg iron- 800 mcg-120 mcg Cap Take by mouth. (Patient not taking: Reported on 10/4/2024)    norelgestromin-ethinyl estradiol (XULANE) 150-35 mcg/24 hr Place 1 patch onto the skin once a week.     No current facility-administered medications for this visit.       Review of Systems:  Review of Systems   Constitutional:  Negative for chills, fatigue and fever.   HENT:  Negative for congestion.    Eyes:  Negative for visual disturbance.   Respiratory:  Negative for cough and shortness of breath.    Cardiovascular:  Negative for chest pain and palpitations.   Gastrointestinal:  Negative for abdominal distention, abdominal pain, constipation, diarrhea, nausea and vomiting.   Genitourinary:  Negative for difficulty urinating, dysuria, hematuria, vaginal bleeding and vaginal discharge.   Skin:  Negative for rash.   Neurological:  Negative for dizziness, seizures, light-headedness and headaches.   Hematological:  Does not bruise/bleed easily.   Psychiatric/Behavioral:  Negative for dysphoric mood. The patient is not nervous/anxious.         OBJECTIVE:     Physical Exam:  Vitals:    10/04/24 0947   BP: (!) 141/89      Physical Exam  Vitals and nursing note reviewed. Exam conducted with a chaperone present.   Constitutional:       General: She is not in acute distress.     Appearance: She is well-developed.   HENT:      Head: Normocephalic and atraumatic.   Eyes:      Pupils: Pupils are equal, round, and reactive to light.   Cardiovascular:      Rate and Rhythm: Normal rate and regular rhythm.   Pulmonary:      Effort:  Pulmonary effort is normal. No respiratory distress.   Chest:   Breasts:     Right: Normal.      Left: Normal.   Abdominal:      General: There is no distension.      Palpations: Abdomen is soft. There is no mass.      Tenderness: There is no abdominal tenderness. There is no guarding.   Genitourinary:     Comments: SSE: Normal external female genitalia, normal urethral meatus, normal vaginal rugae, normal vaginal mucosa, no vaginal blood in canal, normal physiologic discharge, normal cervix, no adnexal masses palpated on bimanual exam.   Musculoskeletal:         General: Normal range of motion.      Cervical back: Normal range of motion and neck supple.   Skin:     General: Skin is warm and dry.   Neurological:      Mental Status: She is alert and oriented to person, place, and time.   Psychiatric:         Behavior: Behavior normal.         Thought Content: Thought content normal.         Judgment: Judgment normal.         ASSESSMENT/PLAN:     1. Well woman exam (Primary)  2. Encounter for surveillance of transdermal patch hormonal contraceptive device  - Pap smear - Due 2027  - Mammogram - At age 42  - Colonoscopy - At age 47  - Calcium and Vitamin D counseling  14 - 30 years old 1,300mg Ca/d; 600 IU vit D/d  31 - 50 years old 1,000 Ca/d; 600 IU vit D/d  51 - 70 year old: 1,200 Ca/d; 600 IU vit D/d  71+ years old 1,200 Ca/d; 800 IU vit D/d  - norelgestromin-ethinyl estradiol (XULANE) 150-35 mcg/24 hr; Place 1 patch onto the skin once a week.  Dispense: 3 patch; Refill: 11    Manuel Johnson M.D.  OB/GYN  [facility]

## 2025-06-30 DIAGNOSIS — Z30.45 ENCOUNTER FOR SURVEILLANCE OF TRANSDERMAL PATCH HORMONAL CONTRACEPTIVE DEVICE: ICD-10-CM

## 2025-06-30 RX ORDER — NORELGESTROMIN AND ETHINYL ESTRADIOL 35; 150 UG/MG; UG/MG
1 PATCH TRANSDERMAL WEEKLY
Qty: 12 PATCH | Refills: 1 | Status: SHIPPED | OUTPATIENT
Start: 2025-06-30

## 2025-06-30 NOTE — TELEPHONE ENCOUNTER
Refill Decision Note   Alicia Leung  is requesting a refill authorization.    Brief Assessment and Rationale for Refill:           Medication Therapy Plan:         Comments:     Note composed:4:02 PM 06/30/2025

## 2025-07-01 ENCOUNTER — TELEPHONE (OUTPATIENT)
Dept: OBSTETRICS AND GYNECOLOGY | Facility: CLINIC | Age: 35
End: 2025-07-01
Payer: MEDICAID

## 2025-07-01 NOTE — TELEPHONE ENCOUNTER
Copied from CRM #[id number]. Topic: General Inquiry - Patient Advice  >> Jun 30, 2025  3:28 PM Tamika wrote:  Type:  Needs Medical Advice    Who Called: pt  Would the patient rather a call back or a response via [employer]? Call back   Best Call Back Number: [phone] (M)    Additional Information: pt requesting a call back in regards to refill request wants to know if the refill will be put through today so she can go pick it up